# Patient Record
Sex: FEMALE | Race: WHITE | NOT HISPANIC OR LATINO | Employment: PART TIME | URBAN - METROPOLITAN AREA
[De-identification: names, ages, dates, MRNs, and addresses within clinical notes are randomized per-mention and may not be internally consistent; named-entity substitution may affect disease eponyms.]

---

## 2017-03-06 ENCOUNTER — HOSPITAL ENCOUNTER (OUTPATIENT)
Dept: FAMILY MEDICINE CLINIC | Facility: CLINIC | Age: 47
Setting detail: SPECIMEN
Discharge: HOME | End: 2017-03-06
Attending: PHYSICIAN ASSISTANT | Admitting: PHYSICIAN ASSISTANT

## 2017-03-06 LAB
ALBUMIN SERPL-MCNC: 4.2 G/DL (ref 3.5–4.8)
ALBUMIN/GLOB SERPL: 1.3 {RATIO} (ref 1–1.7)
ALP SERPL-CCNC: 58 IU/L (ref 32–91)
ALT SERPL-CCNC: 15 IU/L (ref 14–54)
ANION GAP SERPL CALC-SCNC: 15.1 MMOL/L (ref 10–20)
AST SERPL-CCNC: 19 IU/L (ref 15–41)
BASOPHILS # BLD AUTO: 0 10*3/UL (ref 0–0.2)
BASOPHILS NFR BLD AUTO: 1 % (ref 0–2)
BILIRUB SERPL-MCNC: 0.6 MG/DL (ref 0.3–1.2)
BUN SERPL-MCNC: 11 MG/DL (ref 8–20)
BUN/CREAT SERPL: 15.7 (ref 5.4–26.2)
CALCIUM SERPL-MCNC: 9.9 MG/DL (ref 8.9–10.3)
CHLORIDE SERPL-SCNC: 104 MMOL/L (ref 101–111)
CHOLEST SERPL-MCNC: 203 MG/DL
CHOLEST/HDLC SERPL: 2.6 {RATIO}
CONV CO2: 28 MMOL/L (ref 22–32)
CONV LDL CHOLESTEROL DIRECT: 120 MG/DL (ref 0–100)
CONV TOTAL PROTEIN: 7.4 G/DL (ref 6.1–7.9)
CREAT UR-MCNC: 0.7 MG/DL (ref 0.4–1)
DIFFERENTIAL METHOD BLD: (no result)
EOSINOPHIL # BLD AUTO: 0.1 10*3/UL (ref 0–0.3)
EOSINOPHIL # BLD AUTO: 1 % (ref 0–3)
ERYTHROCYTE [DISTWIDTH] IN BLOOD BY AUTOMATED COUNT: 12.7 % (ref 11.5–14.5)
GLOBULIN UR ELPH-MCNC: 3.2 G/DL (ref 2.5–3.8)
GLUCOSE SERPL-MCNC: 72 MG/DL (ref 65–99)
HCT VFR BLD AUTO: 42.2 % (ref 35–49)
HDLC SERPL-MCNC: 78 MG/DL
HGB BLD-MCNC: 14.5 G/DL (ref 12–15)
LDLC/HDLC SERPL: 1.5 {RATIO}
LIPID INTERPRETATION: ABNORMAL
LYMPHOCYTES # BLD AUTO: 2.6 10*3/UL (ref 0.8–4.8)
LYMPHOCYTES NFR BLD AUTO: 27 % (ref 18–42)
MCH RBC QN AUTO: 31.9 PG (ref 26–32)
MCHC RBC AUTO-ENTMCNC: 34.3 G/DL (ref 32–36)
MCV RBC AUTO: 93 FL (ref 80–94)
MONOCYTES # BLD AUTO: 0.5 10*3/UL (ref 0.1–1.3)
MONOCYTES NFR BLD AUTO: 6 % (ref 2–11)
NEUTROPHILS # BLD AUTO: 6.3 10*3/UL (ref 2.3–8.6)
NEUTROPHILS NFR BLD AUTO: 65 % (ref 50–75)
NRBC BLD AUTO-RTO: 0 /100{WBCS}
NRBC/RBC NFR BLD MANUAL: 0 10*3/UL
PLATELET # BLD AUTO: 242 10*3/UL (ref 150–450)
PMV BLD AUTO: 9.1 FL (ref 7.4–10.4)
POTASSIUM SERPL-SCNC: 4.1 MMOL/L (ref 3.6–5.1)
RBC # BLD AUTO: 4.53 10*6/UL (ref 4–5.4)
SODIUM SERPL-SCNC: 143 MMOL/L (ref 136–144)
TRIGL SERPL-MCNC: 72 MG/DL
TSH SERPL-ACNC: 1.39 UIU/ML (ref 0.34–5.6)
VIT B12 SERPL-MCNC: 730 PG/ML (ref 180–914)
VLDLC SERPL CALC-MCNC: 5.6 MG/DL
WBC # BLD AUTO: 9.6 10*3/UL (ref 4.5–11.5)

## 2020-02-13 ENCOUNTER — OFFICE VISIT (OUTPATIENT)
Dept: FAMILY MEDICINE CLINIC | Facility: CLINIC | Age: 50
End: 2020-02-13

## 2020-02-13 VITALS
TEMPERATURE: 98.1 F | HEART RATE: 76 BPM | OXYGEN SATURATION: 96 % | WEIGHT: 122.6 LBS | BODY MASS INDEX: 24.07 KG/M2 | HEIGHT: 60 IN | DIASTOLIC BLOOD PRESSURE: 75 MMHG | RESPIRATION RATE: 20 BRPM | SYSTOLIC BLOOD PRESSURE: 115 MMHG

## 2020-02-13 DIAGNOSIS — L25.9 CONTACT DERMATITIS, UNSPECIFIED CONTACT DERMATITIS TYPE, UNSPECIFIED TRIGGER: Primary | ICD-10-CM

## 2020-02-13 PROBLEM — J01.90 SINUSITIS, ACUTE: Status: ACTIVE | Noted: 2018-10-01

## 2020-02-13 PROBLEM — H61.21 IMPACTED CERUMEN OF RIGHT EAR: Status: RESOLVED | Noted: 2018-10-01 | Resolved: 2020-02-13

## 2020-02-13 PROBLEM — J06.9 VIRAL UPPER RESPIRATORY TRACT INFECTION: Status: RESOLVED | Noted: 2017-11-28 | Resolved: 2020-02-13

## 2020-02-13 PROBLEM — Z12.31 OTHER SCREENING MAMMOGRAM: Status: ACTIVE | Noted: 2017-03-06

## 2020-02-13 PROBLEM — M79.673 PAIN OF FOOT: Status: ACTIVE | Noted: 2017-03-06

## 2020-02-13 PROBLEM — J06.9 VIRAL UPPER RESPIRATORY TRACT INFECTION: Status: ACTIVE | Noted: 2017-11-28

## 2020-02-13 PROBLEM — M21.611 BUNION, RIGHT: Status: ACTIVE | Noted: 2017-03-06

## 2020-02-13 PROBLEM — J01.90 SINUSITIS, ACUTE: Status: RESOLVED | Noted: 2018-10-01 | Resolved: 2020-02-13

## 2020-02-13 PROBLEM — E55.9 VITAMIN D DEFICIENCY: Status: ACTIVE | Noted: 2017-03-09

## 2020-02-13 PROBLEM — H61.21 IMPACTED CERUMEN OF RIGHT EAR: Status: ACTIVE | Noted: 2018-10-01

## 2020-02-13 PROCEDURE — 99213 OFFICE O/P EST LOW 20 MIN: CPT | Performed by: NURSE PRACTITIONER

## 2020-02-13 RX ORDER — IBUPROFEN 800 MG/1
800 TABLET ORAL EVERY 8 HOURS PRN
Qty: 40 TABLET | Refills: 0 | Status: SHIPPED | OUTPATIENT
Start: 2020-02-13 | End: 2020-06-06 | Stop reason: SDUPTHER

## 2020-02-13 RX ORDER — PREDNISONE 10 MG/1
TABLET ORAL
Qty: 20 TABLET | Refills: 0 | Status: SHIPPED | OUTPATIENT
Start: 2020-02-13 | End: 2020-02-21

## 2020-02-13 NOTE — PROGRESS NOTES
"Subjective   Sarah D Frankel is a 50 y.o. female.     Chief Complaint   Patient presents with   • Rash     X 10days       HPI  She has rash on her right [id number] days.then went left arm now on right side back and right stomach. It itches and stings taking benadryl off and on using ivarest. The cream may have helped a little. Before the arm rash she had been outside [place].     The following portions of the patient's history were reviewed and updated as appropriate: allergies, current medications, past family history, past medical history, past social history, past surgical history and problem list.      Current Outpatient Medications:   •  ibuprofen (ADVIL,MOTRIN) 800 MG tablet, Take 1 tablet by mouth Every 8 (Eight) Hours As Needed for Mild Pain ., Disp: 40 tablet, Rfl: 0  •  predniSONE (DELTASONE) 10 MG tablet, Take 4 tablets by mouth Daily for 2 days, THEN 3 tablets Daily for 2 days, THEN 2 tablets Daily for 2 days, THEN 1 tablet Daily for 2 days., Disp: 20 tablet, Rfl: 0    No results found for this or any previous visit (from the past 4032 hour(s)).      Review of Systems   Skin:        Rash scattered to arms and right abdomen   Neurological: Positive for headache.        Thinks from sinus       Objective     /75 (BP Location: Left arm, Patient Position: Sitting, Cuff Size: Adult)   Pulse 76   Temp 98.1 °F (36.7 °C) (Oral)   Resp 20   Ht 152.4 cm (60\")   Wt 55.6 kg (122 lb 9.6 oz)   SpO2 96%   BMI 23.94 kg/m²     Physical Exam   Constitutional: She is oriented to person, place, and time. She appears well-developed and well-nourished.   HENT:   Head: Normocephalic and atraumatic.   Eyes: Pupils are equal, round, and reactive to light. Conjunctivae and EOM are normal.   Neck: Normal range of motion. Neck supple.   Cardiovascular: Normal rate, regular rhythm, normal heart sounds and intact distal pulses.   Pulmonary/Chest: Effort normal and breath sounds normal. "   Abdominal: Soft. Bowel sounds are normal.   Musculoskeletal: Normal range of motion.   Neurological: She is alert and oriented to person, place, and time.   Skin: Skin is warm and dry. Turgor is normal. Rash noted. Rash is macular and pustular.   Psychiatric: She has a normal mood and affect. Her behavior is normal.   Nursing note and vitals reviewed.        Assessment/Plan   Jodie was seen today for rash.    Diagnoses and all orders for this visit:    Contact dermatitis, unspecified contact dermatitis type, unspecified trigger    Other orders  -     predniSONE (DELTASONE) 10 MG tablet; Take 4 tablets by mouth Daily for 2 days, THEN 3 tablets Daily for 2 days, THEN 2 tablets Daily for 2 days, THEN 1 tablet Daily for 2 days.  -     ibuprofen (ADVIL,MOTRIN) 800 MG tablet; Take 1 tablet by mouth Every 8 (Eight) Hours As Needed for Mild Pain .      Patient Instructions   Use dial soap . Take steroids with food. dont cover with dressings.   Take benadryl   Can take daily allergy medications use ibuprofen with food as needed not daily.       Maty Sevilla, APRN    02/13/20

## 2020-02-13 NOTE — PATIENT INSTRUCTIONS
Use dial soap . Take steroids with food. dont cover with dressings.   Take benadryl   Can take daily allergy medications use ibuprofen with food as needed not daily.

## 2020-06-03 ENCOUNTER — OFFICE VISIT (OUTPATIENT)
Dept: FAMILY MEDICINE CLINIC | Facility: CLINIC | Age: 50
End: 2020-06-03

## 2020-06-03 VITALS
HEIGHT: 60 IN | RESPIRATION RATE: 20 BRPM | BODY MASS INDEX: 25.01 KG/M2 | SYSTOLIC BLOOD PRESSURE: 124 MMHG | WEIGHT: 127.4 LBS | OXYGEN SATURATION: 100 % | HEART RATE: 85 BPM | DIASTOLIC BLOOD PRESSURE: 85 MMHG

## 2020-06-03 DIAGNOSIS — M54.50 ACUTE LEFT-SIDED LOW BACK PAIN WITHOUT SCIATICA: Primary | ICD-10-CM

## 2020-06-03 PROCEDURE — 99213 OFFICE O/P EST LOW 20 MIN: CPT | Performed by: NURSE PRACTITIONER

## 2020-06-03 RX ORDER — METHOCARBAMOL 500 MG/1
500 TABLET, FILM COATED ORAL 3 TIMES DAILY
Qty: 30 TABLET | Refills: 0 | Status: SHIPPED | OUTPATIENT
Start: 2020-06-03 | End: 2021-06-07

## 2020-06-03 NOTE — PROGRESS NOTES
"Subjective   Sarah D Frankel is a 50 y.o. female.     Chief Complaint   Patient presents with   • Back Pain     worse with bending       HPI  Patient is here for low back pain started last fri am most pain right lower back. Hard to put on clothing barely  left leg to do anything. Has been taking ibuprofen and using hot and cold . Medication not helping much with pain. Having nausea related to the pain. No previous injury. She is . She was lifting and throwing her 60 lb child on cough while playing .   This was last wed. Denies bowel or bladder incontinence . No fever.   No radicular pain.     The following portions of the patient's history were reviewed and updated as appropriate: allergies, current medications, past family history, past medical history, past social history, past surgical history and problem list.      Current Outpatient Medications:   •  ibuprofen (ADVIL,MOTRIN) 800 MG tablet, Take 1 tablet by mouth Every 8 (Eight) Hours As Needed for Mild Pain ., Disp: 40 tablet, Rfl: 0  •  methocarbamol (Robaxin) 500 MG tablet, Take 1 tablet by mouth 3 (Three) Times a Day., Disp: 30 tablet, Rfl: 0    No results found for this or any previous visit (from the past 4032 hour(s)).      Review of Systems   Constitutional: Negative for fever.   Gastrointestinal:        No bowel incontinence   Genitourinary: Negative for dysuria and urinary incontinence.   Musculoskeletal: Positive for back pain.   Neurological: Negative for weakness, light-headedness, numbness and headache.   Psychiatric/Behavioral: Negative for depressed mood.       Objective     /85 (BP Location: Left arm, Patient Position: Sitting, Cuff Size: Adult)   Pulse 85   Resp 20   Ht 152.4 cm (60\")   Wt 57.8 kg (127 lb 6.4 oz)   SpO2 100%   BMI 24.88 kg/m²     Physical Exam   Constitutional: She appears well-developed and well-nourished.   HENT:   Head: Normocephalic and atraumatic.   Right Ear: External ear normal.   Left Ear: " External ear normal.   Nose: Nose normal.   Mouth/Throat: Oropharynx is clear and moist. No oropharyngeal exudate.   Eyes: Pupils are equal, round, and reactive to light. Conjunctivae are normal.   Neck: Normal range of motion. Neck supple.   Cardiovascular: Normal rate, regular rhythm, normal heart sounds and intact distal pulses.   Pulmonary/Chest: Effort normal and breath sounds normal.   Abdominal: Soft. Bowel sounds are normal.   Musculoskeletal:        Lumbar back: She exhibits decreased range of motion, bony tenderness and pain. She exhibits no swelling, no edema, no deformity and no laceration.        Back:    Neurological: She is alert. She has normal strength and normal reflexes. GCS eye subscore is 4. GCS verbal subscore is 5. GCS motor subscore is 6.   Skin: Skin is warm and dry.   Psychiatric: Her behavior is normal. Judgment and thought content normal. Her mood appears anxious. Cognition and memory are normal.   Nursing note and vitals reviewed.        Assessment/Plan   Jodie was seen today for back pain.    Diagnoses and all orders for this visit:    Acute left-sided low back pain without sciatica  -     Ambulatory Referral to Physical Therapy Evaluate and treat    Other orders  -     methocarbamol (Robaxin) 500 MG tablet; Take 1 tablet by mouth 3 (Three) Times a Day.      Patient Instructions   Heat before activity ice after activity.  Take ibuprofen with food and water every 8 hrs  Use robaxin at night can make you sleepy.   Pain patches over the counter.  Physical therapy.  Acute Back Pain, Adult  Acute back pain is sudden and usually short-lived. It is often caused by an injury to the muscles and tissues in the back. The injury may result from:  · A muscle or ligament getting overstretched or torn (strained). Ligaments are tissues that connect bones to each other. Lifting something improperly can cause a back strain.  · Wear and tear (degeneration) of the spinal disks. Spinal disks are circular  "tissue that provides cushioning between the bones of the spine (vertebrae).  · Twisting motions, such as while playing sports or doing yard work.  · A hit to the back.  · Arthritis.  You may have a physical exam, lab tests, and imaging tests to find the cause of your pain. Acute back pain usually goes away with rest and home care.  Follow these instructions at home:  Managing pain, stiffness, and swelling  · Take over-the-counter and prescription medicines only as told by your health care provider.  · Your health care provider may recommend applying ice during the first 24-48 hours after your pain starts. To do this:  ? Put ice in a plastic bag.  ? Place a towel between your skin and the bag.  ? Leave the ice on for 20 minutes, 2-3 times a day.  · If directed, apply heat to the affected area as often as told by your health care provider. Use the heat source that your health care provider recommends, such as a moist heat pack or a heating pad.  ? Place a towel between your skin and the heat source.  ? Leave the heat on for 20-30 minutes.  ? Remove the heat if your skin turns bright red. This is especially important if you are unable to feel pain, heat, or cold. You have a greater risk of getting burned.  Activity    · Do not stay in bed. Staying in bed for more than 1-2 days can delay your recovery.  · Sit up and stand up straight. Avoid leaning forward when you sit, or hunching over when you stand.  ? If you work at a desk, sit close to it so you do not need to lean over. Keep your chin tucked in. Keep your neck drawn back, and keep your elbows bent at a right angle. Your arms should look like the letter \"L.\"  ? Sit high and close to the steering wheel when you drive. Add lower back (lumbar) support to your car seat, if needed.  · Take short walks on even surfaces as soon as you are able. Try to increase the length of time you walk each day.  · Do not sit, drive, or  one place for more than 30 minutes at a " time. Sitting or standing for long periods of time can put stress on your back.  · Do not drive or use heavy machinery while taking prescription pain medicine.  · Use proper lifting techniques. When you bend and lift, use positions that put less stress on your back:  ? Bend your knees.  ? Keep the load close to your body.  ? Avoid twisting.  · Exercise regularly as told by your health care provider. Exercising helps your back heal faster and helps prevent back injuries by keeping muscles strong and flexible.  · Work with a physical therapist to make a safe exercise program, as recommended by your health care provider. Do any exercises as told by your physical therapist.  Lifestyle  · Maintain a healthy weight. Extra weight puts stress on your back and makes it difficult to have good posture.  · Avoid activities or situations that make you feel anxious or stressed. Stress and anxiety increase muscle tension and can make back pain worse. Learn ways to manage anxiety and stress, such as through exercise.  General instructions  · Sleep on a firm mattress in a comfortable position. Try lying on your side with your knees slightly bent. If you lie on your back, put a pillow under your knees.  · Follow your treatment plan as told by your health care provider. This may include:  ? Cognitive or behavioral therapy.  ? Acupuncture or massage therapy.  ? Meditation or yoga.  Contact a health care provider if:  · You have pain that is not relieved with rest or medicine.  · You have increasing pain going down into your legs or buttocks.  · Your pain does not improve after 2 weeks.  · You have pain at night.  · You lose weight without trying.  · You have a fever or chills.  Get help right away if:  · You develop new bowel or bladder control problems.  · You have unusual weakness or numbness in your arms or legs.  · You develop nausea or vomiting.  · You develop abdominal pain.  · You feel faint.  Summary  · Acute back pain is  sudden and usually short-lived.  · Use proper lifting techniques. When you bend and lift, use positions that put less stress on your back.  · Take over-the-counter and prescription medicines and apply heat or ice as directed by your health care provider.  This information is not intended to replace advice given to you by your health care provider. Make sure you discuss any questions you have with your health care provider.  Document Released: 12/18/2006 Document Revised: 04/07/2020 Document Reviewed: 08/01/2018  [employer] Patient Education © 2020 [employer]    Back Injury Prevention  Back injuries can be very painful. They can also be difficult to heal. After having one back injury, you are more likely to have another one again. It is important to learn how to avoid injuring or re-injuring your back. The following tips can help you to prevent a back injury.  What actions can I take to prevent back injuries?  Nutrition changes  Talk with your health care provider about your overall diet, and especially about foods that strengthen your bones.  · Ask your health care provider how much calcium and vitamin D you need each day. These nutrients help to prevent weakening of the bones (osteoporosis). Osteoporosis can cause broken (fractured) bones, which lead to back pain.  · Eat foods that are good sources of calcium. These include dairy products, green leafy vegetables, and products that have had calcium added to them (fortified).  · Eat foods that are good sources of vitamin D. These include milk and foods that are fortified with vitamin D.  · If needed, take supplements and vitamins as directed by your health care provider.  Physical fitness  Physical fitness strengthens your bones and your muscles. It also increases your balance and strength.  · Exercise for 30 minutes per day on most days of the week, or as directed by your health care provider. Make sure to:  ? Do aerobic exercises, such as walking, jogging, biking,  or swimming.  ? Do exercises that increase balance and strength, such as delma chi and yoga. These can decrease your risk of falling and injuring your back.  ? Do stretching exercises to help with flexibility.  ? Develop strong abdominal muscles. Your abdominal muscles provide a lot of the support that your back needs.  · Maintain a healthy weight. This helps to decrease your risk of a back injury.  Good posture              Prevent back injuries by developing and maintaining a good posture. To do this successfully:  · Sit up and stand up straight. Avoid leaning forward when you sit or hunching over when you stand.  · Choose chairs that have good low-back (lumbar) support.  · If you work at a desk, sit close to it so you do not need to lean over. Keep your chin tucked in. Keep your neck drawn back, and keep your elbows bent at a right angle.  · Sit high and close to the steering wheel when you drive. Add a lumbar support to your car seat, if needed.  · Avoid sitting or standing in one position for very long. Take breaks to get up, stretch, and walk around at least one time every hour. Take breaks every hour if you are driving for long periods of time.  · Sleep on your side with your knees slightly bent, or sleep on your back with a pillow under your knees.    Lifting, twisting, and reaching  Back injuries are more likely to occur when carrying loads and twisting at the same time. When you bend and lift, or reach for items that are high up in shelves, use positions that put less stress on your back.  · Heavy lifting  ? Avoid heavy lifting, especially the kind of heavy lifting that is repetitive. If you must do heavy lifting:  ? Stretch before lifting.  ? Work slowly.  ? Rest between lifts.  ? Use a tool such as a cart or a viviana to move objects.  ? Make several small trips instead of carrying one heavy load.  ? Ask for help when you need it, especially when moving big or heavy objects.  ? Follow these steps when  lifting:  ? Stand with your feet shoulder-width apart.  ? Get as close to the object as you can. Do not try to  a heavy object that is far from your body.  ? Use handles or lifting straps if they are available.  ? Bend at your knees. Squat down, but keep your heels off the floor.  ? Keep your shoulders pulled back, your chin tucked in, and your back straight.  ? Lift the object slowly while you tighten the muscles in your legs, abdomen, and buttocks. Keep the object as close to the center of your body as possible.  ? Follow these steps when putting down a heavy load:  ? Stand with your feet shoulder-width apart.  ? Lower the object slowly while you tighten the muscles in your legs, abdomen, and buttocks. Keep the object as close to the center of your body as possible.  ? Keep your shoulders pulled back, your chin tucked in, and your back straight.  ? Bend at your knees. Squat down, but keep your heels off the floor.  ? Use handles or lifting straps if they are available.  · Twisting and reaching  ? Avoid lifting heavy objects above your waist.  ? Do not twist at your waist while you are lifting or carrying a load. If you need to turn, move your feet.  ? Do not bend over without bending at your knees.  ? Avoid reaching over your head, across a table, or for an object on a high surface.    Other changes    · Avoid wet floors and icy ground. Keep sidewalks clear of ice to prevent falls.  · Do not sleep on a mattress that is too soft or too hard.  · Put heavier objects on shelves at waist level, and put lighter objects on lower or higher shelves.  · Find ways to decrease your stress, such as by exercising, getting a massage, or practicing relaxation techniques. Stress can build up in your muscles. Tense muscles are more vulnerable to injury.  · Talk with your health care provider if you feel anxious or depressed. These conditions can make back pain worse.  · Wear flat heel shoes with cushioned soles.  · Use both  shoulder straps when carrying a backpack.  · Do not use any products that contain nicotine or tobacco, such as cigarettes and e-cigarettes. If you need help quitting, ask your health care provider.  Summary  · Back injuries can be very painful and difficult to heal.  · You can prevent injuring or re-injuring your back by making nutrition changes, working on being physically fit, developing a good posture, and lifting heavy objects in a safe way.  · Making other changes can also help to prevent back injuries. These include eating a healthy diet, exercising regularly and maintaining a healthy weight.  This information is not intended to replace advice given to you by your health care provider. Make sure you discuss any questions you have with your health care provider.  Document Released: 01/25/2006 Document Revised: 02/02/2019 Document Reviewed: 02/02/2019  [employer] Patient Education © 2020 [employer]    Black kohosh for hot flashes  Take calcium with magnesium.       Maty Sevilla, TESS    06/03/20

## 2020-06-03 NOTE — PATIENT INSTRUCTIONS
Heat before activity ice after activity.  Take ibuprofen with food and water every 8 hrs  Use robaxin at night can make you sleepy.   Pain patches over the counter.  Physical therapy.  Acute Back Pain, Adult  Acute back pain is sudden and usually short-lived. It is often caused by an injury to the muscles and tissues in the back. The injury may result from:  · A muscle or ligament getting overstretched or torn (strained). Ligaments are tissues that connect bones to each other. Lifting something improperly can cause a back strain.  · Wear and tear (degeneration) of the spinal disks. Spinal disks are circular tissue that provides cushioning between the bones of the spine (vertebrae).  · Twisting motions, such as while playing sports or doing yard work.  · A hit to the back.  · Arthritis.  You may have a physical exam, lab tests, and imaging tests to find the cause of your pain. Acute back pain usually goes away with rest and home care.  Follow these instructions at home:  Managing pain, stiffness, and swelling  · Take over-the-counter and prescription medicines only as told by your health care provider.  · Your health care provider may recommend applying ice during the first 24-48 hours after your pain starts. To do this:  ? Put ice in a plastic bag.  ? Place a towel between your skin and the bag.  ? Leave the ice on for 20 minutes, 2-3 times a day.  · If directed, apply heat to the affected area as often as told by your health care provider. Use the heat source that your health care provider recommends, such as a moist heat pack or a heating pad.  ? Place a towel between your skin and the heat source.  ? Leave the heat on for 20-30 minutes.  ? Remove the heat if your skin turns bright red. This is especially important if you are unable to feel pain, heat, or cold. You have a greater risk of getting burned.  Activity    · Do not stay in bed. Staying in bed for more than 1-2 days can delay your recovery.  · Sit up and  "stand up straight. Avoid leaning forward when you sit, or hunching over when you stand.  ? If you work at a desk, sit close to it so you do not need to lean over. Keep your chin tucked in. Keep your neck drawn back, and keep your elbows bent at a right angle. Your arms should look like the letter \"L.\"  ? Sit high and close to the steering wheel when you drive. Add lower back (lumbar) support to your car seat, if needed.  · Take short walks on even surfaces as soon as you are able. Try to increase the length of time you walk each day.  · Do not sit, drive, or  one place for more than 30 minutes at a time. Sitting or standing for long periods of time can put stress on your back.  · Do not drive or use heavy machinery while taking prescription pain medicine.  · Use proper lifting techniques. When you bend and lift, use positions that put less stress on your back:  ? Bend your knees.  ? Keep the load close to your body.  ? Avoid twisting.  · Exercise regularly as told by your health care provider. Exercising helps your back heal faster and helps prevent back injuries by keeping muscles strong and flexible.  · Work with a physical therapist to make a safe exercise program, as recommended by your health care provider. Do any exercises as told by your physical therapist.  Lifestyle  · Maintain a healthy weight. Extra weight puts stress on your back and makes it difficult to have good posture.  · Avoid activities or situations that make you feel anxious or stressed. Stress and anxiety increase muscle tension and can make back pain worse. Learn ways to manage anxiety and stress, such as through exercise.  General instructions  · Sleep on a firm mattress in a comfortable position. Try lying on your side with your knees slightly bent. If you lie on your back, put a pillow under your knees.  · Follow your treatment plan as told by your health care provider. This may include:  ? Cognitive or behavioral " therapy.  ? Acupuncture or massage therapy.  ? Meditation or yoga.  Contact a health care provider if:  · You have pain that is not relieved with rest or medicine.  · You have increasing pain going down into your legs or buttocks.  · Your pain does not improve after 2 weeks.  · You have pain at night.  · You lose weight without trying.  · You have a fever or chills.  Get help right away if:  · You develop new bowel or bladder control problems.  · You have unusual weakness or numbness in your arms or legs.  · You develop nausea or vomiting.  · You develop abdominal pain.  · You feel faint.  Summary  · Acute back pain is sudden and usually short-lived.  · Use proper lifting techniques. When you bend and lift, use positions that put less stress on your back.  · Take over-the-counter and prescription medicines and apply heat or ice as directed by your health care provider.  This information is not intended to replace advice given to you by your health care provider. Make sure you discuss any questions you have with your health care provider.  Document Released: 12/18/2006 Document Revised: 04/07/2020 Document Reviewed: 08/01/2018  Elsevier Patient Education © 2020 [employer]    Back Injury Prevention  Back injuries can be very painful. They can also be difficult to heal. After having one back injury, you are more likely to have another one again. It is important to learn how to avoid injuring or re-injuring your back. The following tips can help you to prevent a back injury.  What actions can I take to prevent back injuries?  Nutrition changes  Talk with your health care provider about your overall diet, and especially about foods that strengthen your bones.  · Ask your health care provider how much calcium and vitamin D you need each day. These nutrients help to prevent weakening of the bones (osteoporosis). Osteoporosis can cause broken (fractured) bones, which lead to back pain.  · Eat foods that are good sources  of calcium. These include dairy products, green leafy vegetables, and products that have had calcium added to them (fortified).  · Eat foods that are good sources of vitamin D. These include milk and foods that are fortified with vitamin D.  · If needed, take supplements and vitamins as directed by your health care provider.  Physical fitness  Physical fitness strengthens your bones and your muscles. It also increases your balance and strength.  · Exercise for 30 minutes per day on most days of the week, or as directed by your health care provider. Make sure to:  ? Do aerobic exercises, such as walking, jogging, biking, or swimming.  ? Do exercises that increase balance and strength, such as delma chi and yoga. These can decrease your risk of falling and injuring your back.  ? Do stretching exercises to help with flexibility.  ? Develop strong abdominal muscles. Your abdominal muscles provide a lot of the support that your back needs.  · Maintain a healthy weight. This helps to decrease your risk of a back injury.  Good posture              Prevent back injuries by developing and maintaining a good posture. To do this successfully:  · Sit up and stand up straight. Avoid leaning forward when you sit or hunching over when you stand.  · Choose chairs that have good low-back (lumbar) support.  · If you work at a desk, sit close to it so you do not need to lean over. Keep your chin tucked in. Keep your neck drawn back, and keep your elbows bent at a right angle.  · Sit high and close to the steering wheel when you drive. Add a lumbar support to your car seat, if needed.  · Avoid sitting or standing in one position for very long. Take breaks to get up, stretch, and walk around at least one time every hour. Take breaks every hour if you are driving for long periods of time.  · Sleep on your side with your knees slightly bent, or sleep on your back with a pillow under your knees.    Lifting, twisting, and reaching  Back  injuries are more likely to occur when carrying loads and twisting at the same time. When you bend and lift, or reach for items that are high up in shelves, use positions that put less stress on your back.  · Heavy lifting  ? Avoid heavy lifting, especially the kind of heavy lifting that is repetitive. If you must do heavy lifting:  ? Stretch before lifting.  ? Work slowly.  ? Rest between lifts.  ? Use a tool such as a cart or a viviana to move objects.  ? Make several small trips instead of carrying one heavy load.  ? Ask for help when you need it, especially when moving big or heavy objects.  ? Follow these steps when lifting:  ? Stand with your feet shoulder-width apart.  ? Get as close to the object as you can. Do not try to  a heavy object that is far from your body.  ? Use handles or lifting straps if they are available.  ? Bend at your knees. Squat down, but keep your heels off the floor.  ? Keep your shoulders pulled back, your chin tucked in, and your back straight.  ? Lift the object slowly while you tighten the muscles in your legs, abdomen, and buttocks. Keep the object as close to the center of your body as possible.  ? Follow these steps when putting down a heavy load:  ? Stand with your feet shoulder-width apart.  ? Lower the object slowly while you tighten the muscles in your legs, abdomen, and buttocks. Keep the object as close to the center of your body as possible.  ? Keep your shoulders pulled back, your chin tucked in, and your back straight.  ? Bend at your knees. Squat down, but keep your heels off the floor.  ? Use handles or lifting straps if they are available.  · Twisting and reaching  ? Avoid lifting heavy objects above your waist.  ? Do not twist at your waist while you are lifting or carrying a load. If you need to turn, move your feet.  ? Do not bend over without bending at your knees.  ? Avoid reaching over your head, across a table, or for an object on a high surface.    Other  changes    · Avoid wet floors and icy ground. Keep sidewalks clear of ice to prevent falls.  · Do not sleep on a mattress that is too soft or too hard.  · Put heavier objects on shelves at waist level, and put lighter objects on lower or higher shelves.  · Find ways to decrease your stress, such as by exercising, getting a massage, or practicing relaxation techniques. Stress can build up in your muscles. Tense muscles are more vulnerable to injury.  · Talk with your health care provider if you feel anxious or depressed. These conditions can make back pain worse.  · Wear flat heel shoes with cushioned soles.  · Use both shoulder straps when carrying a backpack.  · Do not use any products that contain nicotine or tobacco, such as cigarettes and e-cigarettes. If you need help quitting, ask your health care provider.  Summary  · Back injuries can be very painful and difficult to heal.  · You can prevent injuring or re-injuring your back by making nutrition changes, working on being physically fit, developing a good posture, and lifting heavy objects in a safe way.  · Making other changes can also help to prevent back injuries. These include eating a healthy diet, exercising regularly and maintaining a healthy weight.  This information is not intended to replace advice given to you by your health care provider. Make sure you discuss any questions you have with your health care provider.  Document Released: 01/25/2006 Document Revised: 02/02/2019 Document Reviewed: 02/02/2019  Elsevier Patient Education © 2020 Elsevier Inc.    Black kohosh for hot flashes  Take calcium with magnesium.

## 2020-06-05 ENCOUNTER — TELEPHONE (OUTPATIENT)
Dept: FAMILY MEDICINE CLINIC | Facility: CLINIC | Age: 50
End: 2020-06-05

## 2020-06-06 RX ORDER — IBUPROFEN 800 MG/1
800 TABLET ORAL EVERY 8 HOURS PRN
Qty: 40 TABLET | Refills: 0 | Status: SHIPPED | OUTPATIENT
Start: 2020-06-06 | End: 2020-10-02

## 2020-06-15 ENCOUNTER — TREATMENT (OUTPATIENT)
Dept: PHYSICAL THERAPY | Facility: CLINIC | Age: 50
End: 2020-06-15

## 2020-06-15 DIAGNOSIS — M54.50 LOW BACK PAIN, UNSPECIFIED BACK PAIN LATERALITY, UNSPECIFIED CHRONICITY, UNSPECIFIED WHETHER SCIATICA PRESENT: Primary | ICD-10-CM

## 2020-06-15 PROCEDURE — 97161 PT EVAL LOW COMPLEX 20 MIN: CPT | Performed by: PHYSICAL THERAPIST

## 2020-06-15 PROCEDURE — 97110 THERAPEUTIC EXERCISES: CPT | Performed by: PHYSICAL THERAPIST

## 2020-06-15 NOTE — PROGRESS NOTES
"Physical Therapy Initial Evaluation and Plan of Care    Patient: Sarah D Frankel   : 1970  Diagnosis/ICD-10 Code:  Acute left-sided low back pain, unspecified whether sciatica present [M54.5]  Referring practitioner: TESS Menjivar  Date of Initial Visit: 6/15/2020  Today's Date: 6/15/2020  Patient seen for 1 sessions           Subjective Questionnaire: OLEGARIO 18%      Subjective 49 yo female with c/o LBP and R LE pain. Pt reports insidious onset of symptoms 2-3 days ago. Primary pain is along the R lateral belt line, R buttock, and the posterolateral LE to as far the mid calf area. Pt initially had some distal numbness and tingling but this has subsided. Symptoms have improved in general but are still limiting pt. Denies recent fever, bowel and bladder changes, and unknown weight changes. 5/10 pain now and 8/10 at worst. Symptoms worsen with prolonged sitting, when in the \"wrong position.\" Symptoms improve with heat and NSAIDs.  MD/NP: prn  Social history: Works as a  at [employer]      Objective          Static Posture   General Observations  Guarded.     Lumbar Spine   Increased lordosis.     Palpation   Left   Hypertonic in the erector spinae.     Right   Hypertonic in the erector spinae.     Neurological Testing     Sensation     Lumbar   Left   Intact: light touch    Right   Intact: light touch    Reflexes   Left   Patellar (L4): normal (2+)    Right   Patellar (L4): normal (2+)    Active Range of Motion     Additional Active Range of Motion Details  Slightly limited with forward bending and right sidebending, central pain provocation.     Strength/Myotome Testing     Lumbar   Left   Normal strength    Right   Normal strength    Tests     Lumbar     Right   Positive crossed SLR and passive SLR.           Assessment & Plan     Assessment  Impairments: abnormal muscle tone, abnormal or restricted ROM, activity intolerance, lacks appropriate home exercise program and pain with " function  Assessment details: 51 yo female with c/o LBP and R LE pain. Pt is with a good response to treatment this date and would benefit from further evaluation and treatment to address the above impairments.  Prognosis: good  Functional Limitations: carrying objects, lifting, walking, pulling, pushing, uncomfortable because of pain, sitting, standing and unable to perform repetitive tasks  Goals  Plan Goals: Short term goals, 1 week: Tolerate HEP progression.  Voice compliance with activity modification.  Report improvement in symptoms.    LTGs, 4 weeks: Improve OLEGARIO to 6%.  Improve lumbar ROM to wfl.  Symptoms to be centralized.  5/5 LE strength throughout.  Rate worst pain at 3/10.  Independent with HEP.    Plan  Therapy options: will be seen for skilled physical therapy services  Other planned modality interventions: modalities prn  Planned therapy interventions: abdominal trunk stabilization, flexibility, functional ROM exercises, home exercise program, manual therapy, neuromuscular re-education, postural training, strengthening, stretching and therapeutic activities  Frequency: 1-2 times per week.  Duration in visits: 10  Treatment plan discussed with: patient        Timed:         Manual Therapy:         mins  30637;     Therapeutic Exercise:    30     mins  73967;     Neuromuscular Danny:        mins  01581;    Therapeutic Activity:          mins  50552;     Gait Training:           mins  22351;     Ultrasound:          mins  73692;    Ionto                                   mins   05590  Self Care                            mins   29689    Un-Timed:  Electrical Stimulation:         mins  58551 ( );  Traction          mins 20678  Low Eval     15     Mins  76187  Mod Eval          Mins  42012  High Eval                            Mins  93339  Re-Eval                               mins  46918        Timed Treatment:   30   mins   Total Treatment:     45   mins    PT SIGNATURE: Jose Mathew, PT, DPT,  OCS  [place] license: [id number]  DATE TREATMENT INITIATED: 6/15/2020    Initial Certification  Certification Period: 9/13/2020  I certify that the therapy services are furnished while this patient is under my care.  The services outlined above are required for this patient and will be reviewed every 90 days.     PHYSICIAN: _____________________________    Maty Sevilla, APRLOPEZ      DATE:     Please sign and return via fax to [phone]. Thank you, [facility].

## 2020-07-06 ENCOUNTER — TREATMENT (OUTPATIENT)
Dept: PHYSICAL THERAPY | Facility: CLINIC | Age: 50
End: 2020-07-06

## 2020-07-06 DIAGNOSIS — M54.50 LOW BACK PAIN, UNSPECIFIED BACK PAIN LATERALITY, UNSPECIFIED CHRONICITY, UNSPECIFIED WHETHER SCIATICA PRESENT: Primary | ICD-10-CM

## 2020-07-06 PROCEDURE — 97110 THERAPEUTIC EXERCISES: CPT | Performed by: PHYSICAL THERAPIST

## 2020-07-06 PROCEDURE — 97012 MECHANICAL TRACTION THERAPY: CPT | Performed by: PHYSICAL THERAPIST

## 2020-07-06 NOTE — PROGRESS NOTES
Physical Therapy Daily Progress Note    VISIT#: 2    Subjective   Pt reports: Symptoms centralizing per pt. HEP going well.      Objective     See Exercise, Manual, and Modality Logs for complete treatment.     Patient Education: HEP    Assessment/Plan Symptoms centralizing. Tolerated trial of txn well.      Progress per Plan of Care            Timed:         Manual Therapy:         mins  12716;     Therapeutic Exercise:    23     mins  22808;     Neuromuscular Danny:        mins  14274;    Therapeutic Activity:          mins  36208;     Gait Training:           mins  61068;     Ultrasound:         mins  49038;    Ionto                                   mins   73503  Self Care                            mins   77620    Un-Timed:  Electrical Stimulation:         mins  90551 ( );  Traction     15     mins 14221  Low Eval          Mins  08021  Mod Eval          Mins  72247  High Eval                            Mins  35825  Re-Eval                               mins  95185    Timed Treatment:   23   mins   Total Treatment:     38   mins    Jose Mathew, PT, DPT, OCS  [place] license: [id number]  Physical Therapist

## 2020-07-15 ENCOUNTER — TREATMENT (OUTPATIENT)
Dept: PHYSICAL THERAPY | Facility: CLINIC | Age: 50
End: 2020-07-15

## 2020-07-15 DIAGNOSIS — M54.50 LOW BACK PAIN, UNSPECIFIED BACK PAIN LATERALITY, UNSPECIFIED CHRONICITY, UNSPECIFIED WHETHER SCIATICA PRESENT: Primary | ICD-10-CM

## 2020-07-15 PROCEDURE — 97110 THERAPEUTIC EXERCISES: CPT | Performed by: PHYSICAL THERAPIST

## 2020-07-15 PROCEDURE — 97012 MECHANICAL TRACTION THERAPY: CPT | Performed by: PHYSICAL THERAPIST

## 2020-07-15 NOTE — PROGRESS NOTES
Physical Therapy Daily Progress Note  Visit: 3    Sarah Frankel reports: stiffness in lumbar spine, but no pain or symptoms into legs.     Subjective Evaluation    Pain  Current pain rating: 3         Objective   See Exercise, Manual, and Modality Logs for complete treatment.       Assessment & Plan     Assessment  Assessment details: Initiated core stabilization therex today without adverse reaction. Pt demo good core activation with minimal cues today. Traction performed due to good response following last session.          Timed:            Therapeutic Exercise:    30     mins  63147;       Un-Timed:  Traction     15     mins 81640      Timed Treatment:   30   mins   Total Treatment:     45   mins  Rosalia Palacios PT  Physical Therapist

## 2020-07-27 ENCOUNTER — TREATMENT (OUTPATIENT)
Dept: PHYSICAL THERAPY | Facility: CLINIC | Age: 50
End: 2020-07-27

## 2020-07-27 DIAGNOSIS — M54.50 LOW BACK PAIN, UNSPECIFIED BACK PAIN LATERALITY, UNSPECIFIED CHRONICITY, UNSPECIFIED WHETHER SCIATICA PRESENT: Primary | ICD-10-CM

## 2020-07-27 PROCEDURE — 97012 MECHANICAL TRACTION THERAPY: CPT | Performed by: PHYSICAL THERAPIST

## 2020-07-27 PROCEDURE — 97110 THERAPEUTIC EXERCISES: CPT | Performed by: PHYSICAL THERAPIST

## 2020-07-27 NOTE — PROGRESS NOTES
Physical Therapy Daily Progress Note    VISIT#: 4    Subjective   Pt reports: Some central lumbar stiffness. LE symptoms improving. HEP going well.      Objective     See Exercise, Manual, and Modality Logs for complete treatment.     Patient Education: HEP    Assessment/Plan Tolerating progression well.      Progress per Plan of Care            Timed:         Manual Therapy:         mins  23752;     Therapeutic Exercise:    30     mins  07214;     Neuromuscular Danny:        mins  83745;    Therapeutic Activity:          mins  70731;     Gait Training:           mins  15407;     Ultrasound:          mins  89451;    Ionto                                   mins   27842  Self Care                            mins   20135    Un-Timed:  Electrical Stimulation:         mins  93416 ( );  Traction     15     mins 81587  Low Eval          Mins  75846  Mod Eval          Mins  63611  High Eval                            Mins  08596  Re-Eval                               mins  52925    Timed Treatment:   30   mins   Total Treatment:     45   mins    Jose Mathew, PT, DPT, OCS  [place] license: [id number]  Physical Therapist

## 2020-10-02 RX ORDER — IBUPROFEN 800 MG/1
TABLET ORAL
Qty: 40 TABLET | Refills: 0 | Status: SHIPPED | OUTPATIENT
Start: 2020-10-02

## 2021-06-04 ENCOUNTER — TELEPHONE (OUTPATIENT)
Dept: FAMILY MEDICINE CLINIC | Facility: CLINIC | Age: 51
End: 2021-06-04

## 2021-06-04 NOTE — TELEPHONE ENCOUNTER
PATIENT STATES SHE IS NOT HAVING IT RIGHT NOW BUT SHE HAS BEEN FEELING SOME CHEST PAIN THAT COMES AND GOES AND ANXIETY. SHE SAID SHE SOMETIMES FEELS DIZZY AND SHORT OF BREATH. SHE FEELS LIKE IT IS ANXIETY BUT IS ASKING FOR A CALL BACK PLEASE. SHE WAS NOT FEELING THIS WAY AT TIME OF PHONE CALL.     [phone]

## 2021-06-04 NOTE — TELEPHONE ENCOUNTER
Returned patient phone call.  She just put her mother under hospice is having anxiety.  She is having chest heaviness and feels short of air.  I explained that if she is having chest pain she needs to go to theED for evalaution.  She is not taking medications at this time.  Discussed that there is no way to know if cardiac vs anxiety .  She understood she has appointment for next week.

## 2021-06-07 ENCOUNTER — LAB (OUTPATIENT)
Dept: LAB | Facility: HOSPITAL | Age: 51
End: 2021-06-07

## 2021-06-07 ENCOUNTER — OFFICE VISIT (OUTPATIENT)
Dept: FAMILY MEDICINE CLINIC | Facility: CLINIC | Age: 51
End: 2021-06-07

## 2021-06-07 VITALS
SYSTOLIC BLOOD PRESSURE: 126 MMHG | DIASTOLIC BLOOD PRESSURE: 83 MMHG | TEMPERATURE: 96.8 F | HEART RATE: 71 BPM | BODY MASS INDEX: 24.15 KG/M2 | HEIGHT: 60 IN | OXYGEN SATURATION: 100 % | WEIGHT: 123 LBS

## 2021-06-07 DIAGNOSIS — F41.9 ANXIETY AND DEPRESSION: ICD-10-CM

## 2021-06-07 DIAGNOSIS — F32.A ANXIETY AND DEPRESSION: ICD-10-CM

## 2021-06-07 DIAGNOSIS — E55.9 VITAMIN D DEFICIENCY: Primary | ICD-10-CM

## 2021-06-07 DIAGNOSIS — R07.89 OTHER CHEST PAIN: ICD-10-CM

## 2021-06-07 DIAGNOSIS — E55.9 VITAMIN D DEFICIENCY: ICD-10-CM

## 2021-06-07 PROBLEM — J01.90 ACUTE SINUSITIS: Status: RESOLVED | Noted: 2018-10-01 | Resolved: 2021-06-07

## 2021-06-07 LAB
25(OH)D3 SERPL-MCNC: 28.2 NG/ML
ALBUMIN SERPL-MCNC: 4.5 G/DL (ref 3.5–5.2)
ALBUMIN/GLOB SERPL: 1.7 G/DL
ALP SERPL-CCNC: 83 U/L (ref 39–117)
ALT SERPL W P-5'-P-CCNC: 12 U/L (ref 1–33)
ANION GAP SERPL CALCULATED.3IONS-SCNC: 9.6 MMOL/L (ref 5–15)
AST SERPL-CCNC: 14 U/L (ref 1–32)
BILIRUB SERPL-MCNC: <0.2 MG/DL (ref 0–1.2)
BUN SERPL-MCNC: 13 MG/DL (ref 6–20)
BUN/CREAT SERPL: 21.3 (ref 7–25)
CALCIUM SPEC-SCNC: 9.4 MG/DL (ref 8.6–10.5)
CHLORIDE SERPL-SCNC: 103 MMOL/L (ref 98–107)
CHOLEST SERPL-MCNC: 198 MG/DL (ref 0–200)
CO2 SERPL-SCNC: 29.4 MMOL/L (ref 22–29)
CREAT SERPL-MCNC: 0.61 MG/DL (ref 0.57–1)
GFR SERPL CREATININE-BSD FRML MDRD: 104 ML/MIN/1.73
GLOBULIN UR ELPH-MCNC: 2.7 GM/DL
GLUCOSE SERPL-MCNC: 62 MG/DL (ref 65–99)
HDLC SERPL-MCNC: 64 MG/DL (ref 40–60)
LDLC SERPL CALC-MCNC: 117 MG/DL (ref 0–100)
LDLC/HDLC SERPL: 1.8 {RATIO}
POTASSIUM SERPL-SCNC: 4.6 MMOL/L (ref 3.5–5.2)
PROT SERPL-MCNC: 7.2 G/DL (ref 6–8.5)
SODIUM SERPL-SCNC: 142 MMOL/L (ref 136–145)
T4 FREE SERPL-MCNC: 1.05 NG/DL (ref 0.93–1.7)
TRIGL SERPL-MCNC: 93 MG/DL (ref 0–150)
TSH SERPL DL<=0.05 MIU/L-ACNC: 1.69 UIU/ML (ref 0.27–4.2)
VLDLC SERPL-MCNC: 17 MG/DL (ref 5–40)

## 2021-06-07 PROCEDURE — 82306 VITAMIN D 25 HYDROXY: CPT

## 2021-06-07 PROCEDURE — 84443 ASSAY THYROID STIM HORMONE: CPT

## 2021-06-07 PROCEDURE — 80061 LIPID PANEL: CPT

## 2021-06-07 PROCEDURE — 99214 OFFICE O/P EST MOD 30 MIN: CPT | Performed by: NURSE PRACTITIONER

## 2021-06-07 PROCEDURE — 36415 COLL VENOUS BLD VENIPUNCTURE: CPT

## 2021-06-07 PROCEDURE — 84439 ASSAY OF FREE THYROXINE: CPT

## 2021-06-07 PROCEDURE — 80053 COMPREHEN METABOLIC PANEL: CPT

## 2021-06-07 RX ORDER — CITALOPRAM 10 MG/1
10 TABLET ORAL DAILY
Qty: 30 TABLET | Refills: 0 | Status: SHIPPED | OUTPATIENT
Start: 2021-06-07

## 2021-06-07 NOTE — PROGRESS NOTES
"Subjective   {CC  Problem List  Visit Diagnosis   Encounters  Notes  Medications  Labs  Result Review Imaging  Media :23}     Sarah D Frankel is a 51 y.o. female.     Chief Complaint   Patient presents with   • Anxiety     anxiety x1 month   • Shortness of Breath     intermittent SOA x1 month   • Chest Pain     intermittent CP x1 month       History of Present Illness  Patient is here for anxiety she feels because her mom is dying right now and was turned over to hospice and her 19 year old is moving out.   She is trying to work during all of this. Has never had anxiety so bad she can't go to work. Feeling overwhelmed.   She said her eye is twitching. This is new. She is sleeping that is not a problem. She does not feel suicidal or homicidal.   She feels pressure on her chest this started with her anxiety feelings.   She was placed on Zoloft after a still birth many years ago and did not like the way the medication made her feel.       The following portions of the patient's history were reviewed and updated as appropriate: allergies, current medications, past family history, past medical history, past social history, past surgical history and problem list.      Current Outpatient Medications:   •  ibuprofen (ADVIL,MOTRIN) 800 MG tablet, TAKE 1 TABLET BY MOUTH EVERY 8 HOURS AS NEEDED FOR MILD PAIN, Disp: 40 tablet, Rfl: 0  •  citalopram (CeleXA) 10 MG tablet, Take 1 tablet by mouth Daily., Disp: 30 tablet, Rfl: 0    No results found for this or any previous visit (from the past 4032 hour(s)).      Review of Systems    Objective     /83 (BP Location: Left arm, Patient Position: Sitting, Cuff Size: Adult)   Pulse 71   Temp 96.8 °F (36 °C) (Infrared)   Ht 152.4 cm (60\")   Wt 55.8 kg (123 lb)   SpO2 100%   BMI 24.02 kg/m²     Physical Exam  Vitals and nursing note reviewed.   Constitutional:       Appearance: She is normal weight. She is not ill-appearing.   HENT:      Head: Normocephalic.   Eyes: "      Pupils: Pupils are equal, round, and reactive to light.   Cardiovascular:      Rate and Rhythm: Normal rate and regular rhythm.      Pulses: Normal pulses.      Heart sounds: Normal heart sounds. No murmur heard.     Pulmonary:      Effort: Pulmonary effort is normal.      Breath sounds: Normal breath sounds. No wheezing.   Abdominal:      General: Bowel sounds are normal. There is no distension.      Palpations: Abdomen is soft.   Musculoskeletal:         General: Normal range of motion.      Cervical back: Normal range of motion and neck supple.   Skin:     General: Skin is warm and dry.      Capillary Refill: Capillary refill takes less than 2 seconds.   Neurological:      General: No focal deficit present.      Mental Status: She is alert and oriented to person, place, and time.   Psychiatric:         Attention and Perception: Attention normal.         Mood and Affect: Mood is anxious. Affect is tearful.         Speech: Speech normal.         Behavior: Behavior is cooperative.         Thought Content: Thought content normal. Thought content does not include homicidal or suicidal ideation.         Cognition and Memory: Cognition normal.         Judgment: Judgment normal. Judgment is not impulsive.         Result Review :                Assessment/Plan    Diagnoses and all orders for this visit:    1. Vitamin D deficiency (Primary)  Comments:  labs ordered   Orders:  -     Vitamin D 25 Hydroxy; Future    2. Other chest pain  Comments:  ruling out anxiety related chest pain, labs ordered today and new medication prescribed   Orders:  -     Lipid Panel; Future  -     Comprehensive Metabolic Panel; Future  -     TSH; Future  -     T4, free; Future    3. Anxiety and depression  Comments:  take advantage of counselors through hospice service.  New medication prescribed     Other orders  -     citalopram (CeleXA) 10 MG tablet; Take 1 tablet by mouth Daily.  Dispense: 30 tablet; Refill: 0      Patient Instructions    Please let us know if your symptoms of anxiety and depression become worse.  If you feel suicidal or homicidal please go to the nearest emergency room.   Take the Celexa everyday  If you feel like your chest pain is worse and no longer related to your anxiety go to the nearest emergency room.       Follow Up   Return in about 1 month (around 7/7/2021).    Patient was given instructions and counseling regarding her condition or for health maintenance advice. Please see specific information pulled into the AVS if appropriate.     Maty Sevilla, TESS    06/07/21

## 2021-06-07 NOTE — PATIENT INSTRUCTIONS
Please let us know if your symptoms of anxiety and depression become worse.  If you feel suicidal or homicidal please go to the nearest emergency room.   Take the Celexa everyday  If you feel like your chest pain is worse and no longer related to your anxiety go to the nearest emergency room.

## 2021-06-10 ENCOUNTER — TELEPHONE (OUTPATIENT)
Dept: FAMILY MEDICINE CLINIC | Facility: CLINIC | Age: 51
End: 2021-06-10

## 2021-06-10 NOTE — TELEPHONE ENCOUNTER
Caller: Frankel, Sarah D    Relationship to patient: Self    Best call back number: [phone]    Patient is needing: PATIENT STATES THAT SHE DID HAVE BREAKFAST THE MORNING THAT SHE LAST CAME IN AND HER SUGAR WAS LOW. SHE WANTS TO KNOW IF ITS NORMAL FOR SUGAR TO GO UP AND THEN COME DOWN LIKE THAT. AND IF SHE SHOULD GET SOMETHING TO START MONITORING HER BLOOD SUGAR. PLEASE REACH OUT TO PATIENT AND ADVISE.

## 2021-06-10 NOTE — TELEPHONE ENCOUNTER
Tell her she can make appointment to discuss but no that blood sugars are different based on what you eat and dont eat.

## 2021-06-24 ENCOUNTER — OFFICE VISIT (OUTPATIENT)
Dept: FAMILY MEDICINE CLINIC | Facility: CLINIC | Age: 51
End: 2021-06-24

## 2021-06-24 ENCOUNTER — LAB (OUTPATIENT)
Dept: LAB | Facility: HOSPITAL | Age: 51
End: 2021-06-24

## 2021-06-24 VITALS
DIASTOLIC BLOOD PRESSURE: 89 MMHG | TEMPERATURE: 97.1 F | SYSTOLIC BLOOD PRESSURE: 131 MMHG | WEIGHT: 120.6 LBS | BODY MASS INDEX: 23.68 KG/M2 | HEART RATE: 80 BPM | OXYGEN SATURATION: 95 % | HEIGHT: 60 IN

## 2021-06-24 DIAGNOSIS — E16.2 LOW BLOOD SUGAR READING: Primary | ICD-10-CM

## 2021-06-24 DIAGNOSIS — E16.2 LOW BLOOD SUGAR READING: ICD-10-CM

## 2021-06-24 LAB
ANION GAP SERPL CALCULATED.3IONS-SCNC: 7.1 MMOL/L (ref 5–15)
BUN SERPL-MCNC: 15 MG/DL (ref 6–20)
BUN/CREAT SERPL: 21.1 (ref 7–25)
CALCIUM SPEC-SCNC: 9.6 MG/DL (ref 8.6–10.5)
CHLORIDE SERPL-SCNC: 101 MMOL/L (ref 98–107)
CO2 SERPL-SCNC: 28.9 MMOL/L (ref 22–29)
CREAT SERPL-MCNC: 0.71 MG/DL (ref 0.57–1)
GFR SERPL CREATININE-BSD FRML MDRD: 87 ML/MIN/1.73
GLUCOSE BLDC GLUCOMTR-MCNC: 85 MG/DL (ref 70–130)
GLUCOSE SERPL-MCNC: 76 MG/DL (ref 65–99)
HBA1C MFR BLD: 5.6 % (ref 3.5–5.6)
POTASSIUM SERPL-SCNC: 4.8 MMOL/L (ref 3.5–5.2)
SODIUM SERPL-SCNC: 137 MMOL/L (ref 136–145)

## 2021-06-24 PROCEDURE — 80048 BASIC METABOLIC PNL TOTAL CA: CPT

## 2021-06-24 PROCEDURE — 36415 COLL VENOUS BLD VENIPUNCTURE: CPT

## 2021-06-24 PROCEDURE — 83036 HEMOGLOBIN GLYCOSYLATED A1C: CPT

## 2021-06-24 PROCEDURE — 99213 OFFICE O/P EST LOW 20 MIN: CPT | Performed by: NURSE PRACTITIONER

## 2021-06-24 NOTE — PROGRESS NOTES
Subjective   {CC  Problem List  Visit Diagnosis   Encounters  Notes  Medications  Labs  Result Review Imaging  Media :23}     Sarah D Frankel is a 51 y.o. female.     Chief Complaint   Patient presents with   • Hypoglycemia     f/u from labs last visit       History of Present Illness  She is here for follow up on her labs. She does not check blood sugars. She had gestational diabetes 20 years ago.   She has not had to check blood sugars since then nor has she been told she has diabetes in the past.   She reports she had eaten the morning she had her labs.   She eats oatmeal and tea in am eats cashews and walnuts. Supper biggest meal snacks black beans at work doesn't eat much at work.   She is always thirsty at work.  Blood sugar in office is 85.     The following portions of the patient's history were reviewed and updated as appropriate: allergies, current medications, past family history, past medical history, past social history, past surgical history and problem list.      Current Outpatient Medications:   •  ibuprofen (ADVIL,MOTRIN) 800 MG tablet, TAKE 1 TABLET BY MOUTH EVERY 8 HOURS AS NEEDED FOR MILD PAIN, Disp: 40 tablet, Rfl: 0  •  citalopram (CeleXA) 10 MG tablet, Take 1 tablet by mouth Daily., Disp: 30 tablet, Rfl: 0    Recent Results (from the past 4032 hour(s))   Lipid Panel    Collection Time: 06/07/21 10:43 AM    Specimen: Blood   Result Value Ref Range    Total Cholesterol 198 0 - 200 mg/dL    Triglycerides 93 0 - 150 mg/dL    HDL Cholesterol 64 (H) 40 - 60 mg/dL    LDL Cholesterol  117 (H) 0 - 100 mg/dL    VLDL Cholesterol 17 5 - 40 mg/dL    LDL/HDL Ratio 1.80    Comprehensive Metabolic Panel    Collection Time: 06/07/21 10:43 AM    Specimen: Blood   Result Value Ref Range    Glucose 62 (L) 65 - 99 mg/dL    BUN 13 6 - 20 mg/dL    Creatinine 0.61 0.57 - 1.00 mg/dL    Sodium 142 136 - 145 mmol/L    Potassium 4.6 3.5 - 5.2 mmol/L    Chloride 103 98 - 107 mmol/L    CO2 29.4 (H) 22.0 - 29.0  "mmol/L    Calcium 9.4 8.6 - 10.5 mg/dL    Total Protein 7.2 6.0 - 8.5 g/dL    Albumin 4.50 3.50 - 5.20 g/dL    ALT (SGPT) 12 1 - 33 U/L    AST (SGOT) 14 1 - 32 U/L    Alkaline Phosphatase 83 39 - 117 U/L    Total Bilirubin <0.2 0.0 - 1.2 mg/dL    eGFR Non African Amer 104 >60 mL/min/1.73    Globulin 2.7 gm/dL    A/G Ratio 1.7 g/dL    BUN/Creatinine Ratio 21.3 7.0 - 25.0    Anion Gap 9.6 5.0 - 15.0 mmol/L   Vitamin D 25 Hydroxy    Collection Time: 06/07/21 10:43 AM    Specimen: Blood   Result Value Ref Range    25 Hydroxy, Vitamin D 28.2 ng/ml   TSH    Collection Time: 06/07/21 10:43 AM    Specimen: Blood   Result Value Ref Range    TSH 1.690 0.270 - 4.200 uIU/mL   T4, free    Collection Time: 06/07/21 10:43 AM    Specimen: Blood   Result Value Ref Range    Free T4 1.05 0.93 - 1.70 ng/dL         Review of Systems    Objective     /89 (BP Location: Left arm, Patient Position: Sitting, Cuff Size: Adult)   Pulse 80   Temp 97.1 °F (36.2 °C) (Infrared)   Ht 152.4 cm (60\")   Wt 54.7 kg (120 lb 9.6 oz)   SpO2 95%   BMI 23.55 kg/m²     Physical Exam  Vitals and nursing note reviewed.   HENT:      Head: Normocephalic.      Right Ear: External ear normal. There is impacted cerumen.      Left Ear: External ear normal.      Mouth/Throat:      Mouth: Mucous membranes are moist.   Eyes:      Pupils: Pupils are equal, round, and reactive to light.   Cardiovascular:      Rate and Rhythm: Normal rate and regular rhythm.      Pulses: Normal pulses.      Heart sounds: Normal heart sounds.   Pulmonary:      Effort: Pulmonary effort is normal.      Breath sounds: Normal breath sounds.   Abdominal:      General: Abdomen is flat. Bowel sounds are normal.      Palpations: Abdomen is soft.   Musculoskeletal:         General: Normal range of motion.   Skin:     General: Skin is warm and dry.      Capillary Refill: Capillary refill takes less than 2 seconds.   Neurological:      General: No focal deficit present.      Mental " Status: She is alert and oriented to person, place, and time.   Psychiatric:         Mood and Affect: Mood normal.         Behavior: Behavior normal.         Thought Content: Thought content normal.         Judgment: Judgment normal.         Result Review :                Assessment/Plan    Diagnoses and all orders for this visit:    1. Low blood sugar reading (Primary)  -     Hemoglobin A1c; Future  -     Basic Metabolic Panel; Future      Patient Instructions   Follow up on testing.  Next test may be glucose tolerance.       Follow Up   No follow-ups on file.    Patient was given instructions and counseling regarding her condition or for health maintenance advice. Please see specific information pulled into the AVS if appropriate.     Maty Sevilla, APRN    06/24/21

## 2021-08-12 ENCOUNTER — OFFICE VISIT (OUTPATIENT)
Dept: FAMILY MEDICINE CLINIC | Facility: CLINIC | Age: 51
End: 2021-08-12

## 2021-08-12 VITALS
HEART RATE: 69 BPM | OXYGEN SATURATION: 99 % | TEMPERATURE: 96.9 F | DIASTOLIC BLOOD PRESSURE: 86 MMHG | HEIGHT: 60 IN | WEIGHT: 128 LBS | SYSTOLIC BLOOD PRESSURE: 124 MMHG | BODY MASS INDEX: 25.13 KG/M2

## 2021-08-12 DIAGNOSIS — H61.21 IMPACTED CERUMEN, RIGHT EAR: Primary | ICD-10-CM

## 2021-08-12 PROCEDURE — 99213 OFFICE O/P EST LOW 20 MIN: CPT | Performed by: NURSE PRACTITIONER

## 2021-08-12 RX ORDER — PSEUDOEPHEDRINE HCL 30 MG
30 TABLET ORAL EVERY 4 HOURS PRN
COMMUNITY

## 2021-08-12 NOTE — PROGRESS NOTES
Subjective   {CC  Problem List  Visit Diagnosis   Encounters  Notes  Medications  Labs  Result Review Imaging  Media :23}     Sarah D Frankel is a 51 y.o. female.     Chief Complaint   Patient presents with   • Earache     R side ear/facial/neck pain       History of Present Illness  Patient having pressure right ear and then shoots pain up right side of her head. Few times jaw was sore.   If she takes pseudoephedrine and ibuprofen it helps but wont go away.   She is very stressed due to the death of her mom and all the cleaning she has had and is very stressed.       The following portions of the patient's history were reviewed and updated as appropriate: allergies, current medications, past family history, past medical history, past social history, past surgical history and problem list.      Current Outpatient Medications:   •  ibuprofen (ADVIL,MOTRIN) 800 MG tablet, TAKE 1 TABLET BY MOUTH EVERY 8 HOURS AS NEEDED FOR MILD PAIN, Disp: 40 tablet, Rfl: 0  •  pseudoephedrine (SUDAFED) 30 MG tablet, Take 30 mg by mouth Every 4 (Four) Hours As Needed for Congestion., Disp: , Rfl:   •  citalopram (CeleXA) 10 MG tablet, Take 1 tablet by mouth Daily. (Patient taking differently: Take 10 mg by mouth Daily. PT NEVER STARTED TAKING), Disp: 30 tablet, Rfl: 0    Recent Results (from the past 4032 hour(s))   Lipid Panel    Collection Time: 06/07/21 10:43 AM    Specimen: Blood   Result Value Ref Range    Total Cholesterol 198 0 - 200 mg/dL    Triglycerides 93 0 - 150 mg/dL    HDL Cholesterol 64 (H) 40 - 60 mg/dL    LDL Cholesterol  117 (H) 0 - 100 mg/dL    VLDL Cholesterol 17 5 - 40 mg/dL    LDL/HDL Ratio 1.80    Comprehensive Metabolic Panel    Collection Time: 06/07/21 10:43 AM    Specimen: Blood   Result Value Ref Range    Glucose 62 (L) 65 - 99 mg/dL    BUN 13 6 - 20 mg/dL    Creatinine 0.61 0.57 - 1.00 mg/dL    Sodium 142 136 - 145 mmol/L    Potassium 4.6 3.5 - 5.2 mmol/L    Chloride 103 98 - 107 mmol/L    CO2 29.4  "(H) 22.0 - 29.0 mmol/L    Calcium 9.4 8.6 - 10.5 mg/dL    Total Protein 7.2 6.0 - 8.5 g/dL    Albumin 4.50 3.50 - 5.20 g/dL    ALT (SGPT) 12 1 - 33 U/L    AST (SGOT) 14 1 - 32 U/L    Alkaline Phosphatase 83 39 - 117 U/L    Total Bilirubin <0.2 0.0 - 1.2 mg/dL    eGFR Non African Amer 104 >60 mL/min/1.73    Globulin 2.7 gm/dL    A/G Ratio 1.7 g/dL    BUN/Creatinine Ratio 21.3 7.0 - 25.0    Anion Gap 9.6 5.0 - 15.0 mmol/L   Vitamin D 25 Hydroxy    Collection Time: 06/07/21 10:43 AM    Specimen: Blood   Result Value Ref Range    25 Hydroxy, Vitamin D 28.2 ng/ml   TSH    Collection Time: 06/07/21 10:43 AM    Specimen: Blood   Result Value Ref Range    TSH 1.690 0.270 - 4.200 uIU/mL   T4, free    Collection Time: 06/07/21 10:43 AM    Specimen: Blood   Result Value Ref Range    Free T4 1.05 0.93 - 1.70 ng/dL   POC Glucose    Collection Time: 06/24/21  2:23 PM    Specimen: Blood   Result Value Ref Range    Glucose 85 70 - 130 mg/dL   Hemoglobin A1c    Collection Time: 06/24/21  2:28 PM    Specimen: Blood   Result Value Ref Range    Hemoglobin A1C 5.6 3.5 - 5.6 %   Basic Metabolic Panel    Collection Time: 06/24/21  2:28 PM    Specimen: Blood   Result Value Ref Range    Glucose 76 65 - 99 mg/dL    BUN 15 6 - 20 mg/dL    Creatinine 0.71 0.57 - 1.00 mg/dL    Sodium 137 136 - 145 mmol/L    Potassium 4.8 3.5 - 5.2 mmol/L    Chloride 101 98 - 107 mmol/L    CO2 28.9 22.0 - 29.0 mmol/L    Calcium 9.6 8.6 - 10.5 mg/dL    eGFR Non African Amer 87 >60 mL/min/1.73    BUN/Creatinine Ratio 21.1 7.0 - 25.0    Anion Gap 7.1 5.0 - 15.0 mmol/L         Review of Systems    Objective     /86 (BP Location: Left arm, Patient Position: Sitting, Cuff Size: Adult)   Pulse 69   Temp 96.9 °F (36.1 °C) (Infrared)   Ht 152.4 cm (60\")   Wt 58.1 kg (128 lb)   SpO2 99%   BMI 25.00 kg/m²     Physical Exam  Vitals and nursing note reviewed.   HENT:      Head: Normocephalic.      Right Ear: There is impacted cerumen.      Nose: Nose normal.      " Mouth/Throat:      Mouth: Mucous membranes are moist.   Eyes:      Pupils: Pupils are equal, round, and reactive to light.   Cardiovascular:      Rate and Rhythm: Normal rate and regular rhythm.      Pulses: Normal pulses.      Heart sounds: Normal heart sounds.   Pulmonary:      Effort: Pulmonary effort is normal.      Breath sounds: Normal breath sounds.   Abdominal:      Palpations: Abdomen is soft.   Neurological:      General: No focal deficit present.      Mental Status: She is alert and oriented to person, place, and time.   Psychiatric:         Attention and Perception: Attention normal.         Mood and Affect: Mood and affect normal.         Speech: Speech normal.         Behavior: Behavior normal.         Thought Content: Thought content normal.         Cognition and Memory: Cognition and memory normal.         Judgment: Judgment normal.         Result Review :                Assessment/Plan    There are no diagnoses linked to this encounter.  Patient Instructions   Can use the ear wicking candle.   Avoid getting water in the ear.   Follow up with ENT      Follow Up   No follow-ups on file.    Patient was given instructions and counseling regarding her condition or for health maintenance advice. Please see specific information pulled into the AVS if appropriate.     Maty Sevilla, APRN    08/12/21

## 2024-11-22 ENCOUNTER — HOSPITAL ENCOUNTER (EMERGENCY)
Facility: HOSPITAL | Age: 54
Discharge: HOME | End: 2024-11-22
Payer: MEDICAID

## 2024-11-22 ENCOUNTER — APPOINTMENT (OUTPATIENT)
Dept: CT IMAGING | Facility: HOSPITAL | Age: 54
End: 2024-11-22
Payer: MEDICAID

## 2024-11-22 ENCOUNTER — NURSE TRIAGE (OUTPATIENT)
Dept: CALL CENTER | Facility: HOSPITAL | Age: 54
End: 2024-11-22
Payer: MEDICAID

## 2024-11-22 ENCOUNTER — APPOINTMENT (OUTPATIENT)
Dept: MRI IMAGING | Facility: HOSPITAL | Age: 54
End: 2024-11-22
Payer: MEDICAID

## 2024-11-22 VITALS
RESPIRATION RATE: 16 BRPM | DIASTOLIC BLOOD PRESSURE: 79 MMHG | TEMPERATURE: 97.9 F | HEART RATE: 73 BPM | HEIGHT: 60 IN | WEIGHT: 130.51 LBS | SYSTOLIC BLOOD PRESSURE: 129 MMHG | BODY MASS INDEX: 25.62 KG/M2 | OXYGEN SATURATION: 98 %

## 2024-11-22 DIAGNOSIS — R53.1 WEAKNESS: ICD-10-CM

## 2024-11-22 DIAGNOSIS — R20.2 FACIAL PARESTHESIA: Primary | ICD-10-CM

## 2024-11-22 LAB
ALBUMIN SERPL-MCNC: 4.7 G/DL (ref 3.5–5.2)
ALBUMIN/GLOB SERPL: 1.5 G/DL
ALP SERPL-CCNC: 113 U/L (ref 39–117)
ALT SERPL W P-5'-P-CCNC: 25 U/L (ref 1–33)
ANION GAP SERPL CALCULATED.3IONS-SCNC: 11.3 MMOL/L (ref 5–15)
AST SERPL-CCNC: 25 U/L (ref 1–32)
BASOPHILS # BLD AUTO: 0.04 10*3/MM3 (ref 0–0.2)
BASOPHILS NFR BLD AUTO: 0.6 % (ref 0–1.5)
BILIRUB SERPL-MCNC: 0.2 MG/DL (ref 0–1.2)
BILIRUB UR QL STRIP: NEGATIVE
BUN SERPL-MCNC: 12 MG/DL (ref 6–20)
BUN/CREAT SERPL: 16.4 (ref 7–25)
CALCIUM SPEC-SCNC: 9.1 MG/DL (ref 8.6–10.5)
CHLORIDE SERPL-SCNC: 103 MMOL/L (ref 98–107)
CLARITY UR: CLEAR
CO2 SERPL-SCNC: 28.7 MMOL/L (ref 22–29)
COLOR UR: YELLOW
CREAT SERPL-MCNC: 0.73 MG/DL (ref 0.57–1)
DEPRECATED RDW RBC AUTO: 41.3 FL (ref 37–54)
EGFRCR SERPLBLD CKD-EPI 2021: 99.1 ML/MIN/1.73
EOSINOPHIL # BLD AUTO: 0.17 10*3/MM3 (ref 0–0.4)
EOSINOPHIL NFR BLD AUTO: 2.7 % (ref 0.3–6.2)
ERYTHROCYTE [DISTWIDTH] IN BLOOD BY AUTOMATED COUNT: 12.1 % (ref 12.3–15.4)
GLOBULIN UR ELPH-MCNC: 3.1 GM/DL
GLUCOSE SERPL-MCNC: 75 MG/DL (ref 65–99)
GLUCOSE UR STRIP-MCNC: NEGATIVE MG/DL
HCT VFR BLD AUTO: 43.5 % (ref 34–46.6)
HGB BLD-MCNC: 14.9 G/DL (ref 12–15.9)
HGB UR QL STRIP.AUTO: NEGATIVE
HOLD SPECIMEN: NORMAL
HOLD SPECIMEN: NORMAL
IMM GRANULOCYTES # BLD AUTO: 0.01 10*3/MM3 (ref 0–0.05)
IMM GRANULOCYTES NFR BLD AUTO: 0.2 % (ref 0–0.5)
KETONES UR QL STRIP: NEGATIVE
LEUKOCYTE ESTERASE UR QL STRIP.AUTO: NEGATIVE
LYMPHOCYTES # BLD AUTO: 2.9 10*3/MM3 (ref 0.7–3.1)
LYMPHOCYTES NFR BLD AUTO: 45.5 % (ref 19.6–45.3)
MCH RBC QN AUTO: 31.8 PG (ref 26.6–33)
MCHC RBC AUTO-ENTMCNC: 34.3 G/DL (ref 31.5–35.7)
MCV RBC AUTO: 92.9 FL (ref 79–97)
MONOCYTES # BLD AUTO: 0.41 10*3/MM3 (ref 0.1–0.9)
MONOCYTES NFR BLD AUTO: 6.4 % (ref 5–12)
NEUTROPHILS NFR BLD AUTO: 2.84 10*3/MM3 (ref 1.7–7)
NEUTROPHILS NFR BLD AUTO: 44.6 % (ref 42.7–76)
NITRITE UR QL STRIP: NEGATIVE
NRBC BLD AUTO-RTO: 0 /100 WBC (ref 0–0.2)
PH UR STRIP.AUTO: 5.5 [PH] (ref 5–8)
PLATELET # BLD AUTO: 287 10*3/MM3 (ref 140–450)
PMV BLD AUTO: 9.7 FL (ref 6–12)
POTASSIUM SERPL-SCNC: 4.2 MMOL/L (ref 3.5–5.2)
PROT SERPL-MCNC: 7.8 G/DL (ref 6–8.5)
PROT UR QL STRIP: NEGATIVE
RBC # BLD AUTO: 4.68 10*6/MM3 (ref 3.77–5.28)
SODIUM SERPL-SCNC: 143 MMOL/L (ref 136–145)
SP GR UR STRIP: <=1.005 (ref 1–1.03)
UROBILINOGEN UR QL STRIP: NORMAL
WBC NRBC COR # BLD AUTO: 6.37 10*3/MM3 (ref 3.4–10.8)
WHOLE BLOOD HOLD COAG: NORMAL
WHOLE BLOOD HOLD SPECIMEN: NORMAL

## 2024-11-22 PROCEDURE — 70498 CT ANGIOGRAPHY NECK: CPT

## 2024-11-22 PROCEDURE — 80053 COMPREHEN METABOLIC PANEL: CPT

## 2024-11-22 PROCEDURE — 99285 EMERGENCY DEPT VISIT HI MDM: CPT

## 2024-11-22 PROCEDURE — 25510000001 IOPAMIDOL PER 1 ML

## 2024-11-22 PROCEDURE — 70450 CT HEAD/BRAIN W/O DYE: CPT

## 2024-11-22 PROCEDURE — 81003 URINALYSIS AUTO W/O SCOPE: CPT

## 2024-11-22 PROCEDURE — 70544 MR ANGIOGRAPHY HEAD W/O DYE: CPT

## 2024-11-22 PROCEDURE — 85025 COMPLETE CBC W/AUTO DIFF WBC: CPT

## 2024-11-22 PROCEDURE — 70496 CT ANGIOGRAPHY HEAD: CPT

## 2024-11-22 RX ORDER — SODIUM CHLORIDE 0.9 % (FLUSH) 0.9 %
10 SYRINGE (ML) INJECTION AS NEEDED
Status: DISCONTINUED | OUTPATIENT
Start: 2024-11-22 | End: 2024-11-22 | Stop reason: HOSPADM

## 2024-11-22 RX ORDER — IOPAMIDOL 755 MG/ML
100 INJECTION, SOLUTION INTRAVASCULAR
Status: COMPLETED | OUTPATIENT
Start: 2024-11-22 | End: 2024-11-22

## 2024-11-22 RX ADMIN — IOPAMIDOL 100 ML: 755 INJECTION, SOLUTION INTRAVENOUS at 11:37

## 2024-11-22 NOTE — DISCHARGE INSTRUCTIONS
Rest.  Follow-up with primary care as needed.  Patient connection has been provided to you to establish a primary care provider.  Return to the ER for any new or worsening symptoms.

## 2024-11-22 NOTE — TELEPHONE ENCOUNTER
"Reason for Disposition   [1] Numbness (i.e., loss of sensation) of the face, arm / hand, or leg / foot on one side of the body AND [2] sudden onset AND [3] present now    Additional Information   Negative: [1] SEVERE weakness (i.e., unable to walk or barely able to walk, requires support) AND [2] new-onset or worsening   Negative: [1] Weakness (i.e., paralysis, loss of muscle strength) of the face, arm / hand, or leg / foot on one side of the body AND [2] sudden onset AND [3] present now  (Exception: Bell's palsy suspected [i.e., weakness only on one side of the face, developing over hours to days, no other symptoms].)    Answer Assessment - Initial Assessment Questions  1. SYMPTOM: \"What is the main symptom you are concerned about?\" (e.g., weakness, numbness)      Numbness    2. ONSET: \"When did this start?\" (minutes, hours, days; while sleeping)      Numbess in lips, right side of face and eye drooping  3. LAST NORMAL: \"When was the last time you (the patient) were normal (no symptoms)?\"      yesterday  4. PATTERN \"Does this come and go, or has it been constant since it started?\"  \"Is it present now?\"      constant  5. CARDIAC SYMPTOMS: \"Have you had any of the following symptoms: chest pain, difficulty breathing, palpitations?\"      denies  6. NEUROLOGIC SYMPTOMS: \"Have you had any of the following symptoms: headache, dizziness, vision loss, double vision, changes in speech, unsteady on your feet?\"      denies  7. OTHER SYMPTOMS: \"Do you have any other symptoms?\"      Has had chest cold x two weeks  8. PREGNANCY: \"Is there any chance you are pregnant?\" \"When was your last menstrual period?\"      na    Protocols used: Neurologic Deficit-ADULT-  Patient reports been dealing with chest cold x two weeks, but yesterday started having numbess of lips ,tingling tongue and right side eye drooping.  Denies any other s/s discussed care advice with patient refusing to go to ER or hospital, explained it could be more " serious and needs to be evaluated, states she will go to [facility].

## 2024-11-22 NOTE — Clinical Note
[facility] EMERGENCY DEPARTMENT  [address] [place] [address]  Phone: [phone]    Sarah Frankel was seen and treated in our emergency department on 11/22/2024.  She may return to work on 11/24/2024.         Thank you for choosing [facility].    Nell Zavala APRN

## 2024-11-22 NOTE — ED PROVIDER NOTES
Subjective   History of Present Illness  Chief complaint: Facial droop Right side    Context: Patient is a 54-year-old female who presents to the ER today with complaints of right-sided facial droop and tingling that she noticed yesterday morning at 0500.  Patient denies any visual changes, headache, balance issues, chest pain, shortness of air or fever.  Patient reports she did have some dizziness that she noticed sometime yesterday.  Patient denies any history of strokes.    PCP: None    LMP: Unknown          Review of Systems   Constitutional:  Negative for fever.       Past Medical History:   Diagnosis Date    Acute sinusitis     Allergic rhinitis     Arm pain     Bunion, left     Bunion, right     Contact dermatitis     Dizziness     Foot pain, left     Foot pain, right     Gestational diabetes     Headache     Impacted cerumen, right ear     Viral upper respiratory tract infection     Vitamin D deficiency        No Known Allergies    Past Surgical History:   Procedure Laterality Date     SECTION  ,       Family History   Problem Relation Age of Onset    Heart disease Mother     Hypertension Mother     Stroke Mother     Arthritis Father     Diabetes Father        Social History     Socioeconomic History    Marital status: Single   Tobacco Use    Smoking status: Never    Smokeless tobacco: Never   Vaping Use    Vaping status: Never Used   Substance and Sexual Activity    Alcohol use: Yes     Comment: socially    Drug use: Never           Objective   Physical Exam  Constitutional:       Appearance: Normal appearance.   HENT:      Head: Normocephalic and atraumatic.      Nose: Nose normal.      Mouth/Throat:      Mouth: Mucous membranes are moist.   Eyes:      Extraocular Movements: Extraocular movements intact.      Pupils: Pupils are equal, round, and reactive to light.   Cardiovascular:      Rate and Rhythm: Normal rate and regular rhythm.      Pulses: Normal pulses.      Heart sounds: Normal  "heart sounds.   Pulmonary:      Effort: Pulmonary effort is normal.      Breath sounds: Normal breath sounds.   Abdominal:      General: Abdomen is flat.      Palpations: Abdomen is soft.      Tenderness: There is no abdominal tenderness.   Musculoskeletal:         General: Normal range of motion.      Cervical back: Normal range of motion.   Skin:     General: Skin is warm and dry.      Capillary Refill: Capillary refill takes less than 2 seconds.   Neurological:      General: No focal deficit present.      Mental Status: She is alert and oriented to person, place, and time.      Sensory: No sensory deficit.      Motor: No weakness.   Psychiatric:         Mood and Affect: Mood normal.         Behavior: Behavior normal.         Procedures           ED Course             /76   Pulse 69   Temp 97.9 °F (36.6 °C) (Oral)   Resp 16   Ht 152.4 cm (60\")   Wt 59.2 kg (130 lb 8.2 oz)   SpO2 95%   BMI 25.49 kg/m²   Labs Reviewed   CBC WITH AUTO DIFFERENTIAL - Abnormal; Notable for the following components:       Result Value    RDW 12.1 (*)     Lymphocyte % 45.5 (*)     All other components within normal limits   URINALYSIS W/ MICROSCOPIC IF INDICATED (NO CULTURE) - Normal    Narrative:     Urine microscopic not indicated.   RAINBOW DRAW    Narrative:     The following orders were created for panel order [place] Draw.  Procedure                               Abnormality         Status                     ---------                               -----------         ------                     Green Top (Gel)[[id number]]                                  Final result               Lavender Top[[id number]]                                     Final result               Gold Top - SST[[id number]]                                   Final result               Light Blue Top[[id number]]                                   Final result                 Please view results for these tests on the individual orders.   COMPREHENSIVE " METABOLIC PANEL    Narrative:     GFR Normal >60  Chronic Kidney Disease <60  Kidney Failure <15     GREEN TOP   LAVENDER TOP   GOLD TOP - SST   LIGHT BLUE TOP   CBC AND DIFFERENTIAL    Narrative:     The following orders were created for panel order CBC & Differential.  Procedure                               Abnormality         Status                     ---------                               -----------         ------                     CBC Auto Differential[[id number]]        Abnormal            Final result                 Please view results for these tests on the individual orders.     Medications   sodium chloride 0.9 % flush 10 mL (has no administration in time range)   iopamidol (ISOVUE-370) 76 % injection 100 mL (100 mL Intravenous Given 11/22/24 1137)       MRI Angiogram Head Without Contrast    Result Date: 11/22/2024  Impression: Negative MRA of the [place]. No change from the negative CTA head and neck performed 3 hours earlier today. Electronically Signed: Malgorzata Chow MD  11/22/2024 3:13 PM EST  Workstation ID: [id number]    CT Angiogram Head    Result Date: 11/22/2024  Impression: 1. No central intracranial large vessel occlusion. 2. Patent bilateral carotid arteries without stenosis. 3. Patent bilateral vertebral arteries. Electronically Signed: Richy Bragg MD  11/22/2024 12:46 PM EST  Workstation ID: [id number]    CT Angiogram Neck    Result Date: 11/22/2024  Impression: 1. No central intracranial large vessel occlusion. 2. Patent bilateral carotid arteries without stenosis. 3. Patent bilateral vertebral arteries. Electronically Signed: Richy Bragg MD  11/22/2024 12:46 PM EST  Workstation ID: [id number]    CT Head Without Contrast    Result Date: 11/22/2024  Impression: No acute intracranial abnormality. Electronically Signed: Richy Bragg MD  11/22/2024 11:46 AM EST  Workstation ID: [id number]                      NIH Stroke Scale: 1                        Medical Decision  Making  Radiology interpretation: CT head reviewed and interpreted by Alix: Negative for ICH  CTA head reviewed and interpreted by Alix:   CTA neck reviewed and interpreted by Alix:  Further interpretation by radiologist as above  Lab interpretation:  Labs all viewed by me and significant for: UA-unremarkable.  BUN 12, creatinine 0.73, white count 6.37 hemoglobin 14.9.    EKG was considered but was not emergently warranted at this time.    IV was established and labs and scans were obtained to evaluate for stroke, dissection, infection, electrolyte imbalance.  Patient is a 54-year-old female who presents today with complaint of right-sided facial tingling and droop that she noticed yesterday morning at 0500. LKW 2200 11/20.  On initial examination NIH was 1.  Patient had slight paralysis to right side of face, unilateral weakness, or decreased sensation.  CT head without, CTA head and CTA neck ordered.  CTA head, CTA neck, and CT head without all negative.  Consulted Dr. Thomson, advised to order MRI head without contrast.  Discussed MRI findings with Dr. Thomson who advised to discharge home with follow-up with primary care provider.  On reexamination patient was resting comfortably in the bed with no signs and symptoms of distress.  Discussed findings and decision to discharge home with follow-up with primary care provider.  Provided patient connection in order to establish a primary care provider.  Advised patient to return to the ER for any new or worsening symptoms.  Patient verbalized understanding of all discharge instructions.    Appropriate PPE worn during exam.  Discussed care with: Alix    i discussed findings with patient who voices understanding of discharge instructions, signs and symptoms requiring return to ED; discharged improved and in stable condition with follow up for re-evaluation.  This document is intended for medical expert use only. Reading of this document by patients and/or patient's  family without participating medical staff guidance may result in misinterpretation and unintended morbidity.  Any interpretation of such data is the responsibility of the patient and/or family member responsible for the patient in concert with their primary or specialist providers, not to be left for sources of online searches such as Simple-Fill, Hubskip or similar queries. Relying on these approaches to knowledge may result in misinterpretation, misguided goals of care and even death should patients or family members try recommendations outside of the realm of professional medical care in a supervised inpatient environment.         Problems Addressed:  Facial paresthesia: complicated acute illness or injury  Weakness: complicated acute illness or injury    Amount and/or Complexity of Data Reviewed  Labs: ordered.  Radiology: ordered.    Risk  Prescription drug management.         Final diagnoses:   Weakness   Facial paresthesia       ED Disposition  ED Disposition       ED Disposition   Discharge    Condition   Stable    Comment   --               PATIENT CONNECTION - [facility] [address]  [phone]  Call on 11/25/2024  Call on Monday to establish a primary care provider for follow up.         Medication List        Changed      citalopram 10 MG tablet  Commonly known as: CeleXA  Take 1 tablet by mouth Daily.  What changed: additional instructions                 Nell Zavala, APRN  11/22/24 0519

## 2024-11-26 ENCOUNTER — OFFICE VISIT (OUTPATIENT)
Dept: FAMILY MEDICINE CLINIC | Facility: CLINIC | Age: 54
End: 2024-11-26
Payer: MEDICAID

## 2024-11-26 VITALS
HEIGHT: 60 IN | BODY MASS INDEX: 25.13 KG/M2 | HEART RATE: 79 BPM | SYSTOLIC BLOOD PRESSURE: 124 MMHG | RESPIRATION RATE: 18 BRPM | DIASTOLIC BLOOD PRESSURE: 78 MMHG | WEIGHT: 128 LBS | OXYGEN SATURATION: 97 %

## 2024-11-26 DIAGNOSIS — G51.0 BELL'S PALSY: Primary | ICD-10-CM

## 2024-11-26 DIAGNOSIS — Z12.31 ENCOUNTER FOR SCREENING MAMMOGRAM FOR MALIGNANT NEOPLASM OF BREAST: ICD-10-CM

## 2024-11-26 PROCEDURE — 1159F MED LIST DOCD IN RCRD: CPT | Performed by: PHYSICIAN ASSISTANT

## 2024-11-26 PROCEDURE — 1125F AMNT PAIN NOTED PAIN PRSNT: CPT | Performed by: PHYSICIAN ASSISTANT

## 2024-11-26 PROCEDURE — 99203 OFFICE O/P NEW LOW 30 MIN: CPT | Performed by: PHYSICIAN ASSISTANT

## 2024-11-26 PROCEDURE — 1160F RVW MEDS BY RX/DR IN RCRD: CPT | Performed by: PHYSICIAN ASSISTANT

## 2024-11-26 RX ORDER — PREDNISONE 20 MG/1
TABLET ORAL
Qty: 21 TABLET | Refills: 0 | Status: SHIPPED | OUTPATIENT
Start: 2024-11-26

## 2024-11-26 RX ORDER — VALACYCLOVIR HYDROCHLORIDE 1 G/1
1000 TABLET, FILM COATED ORAL 3 TIMES DAILY
Qty: 21 TABLET | Refills: 0 | Status: SHIPPED | OUTPATIENT
Start: 2024-11-26

## 2024-11-26 NOTE — PROGRESS NOTES
"Chief Complaint  Chief Complaint   Patient presents with    [facility] Care    bells palsy       Subjective        Sarah D Frankel is a 54 y.o. female who presents to [facility].  History of Present Illness  Presents today for follow-up after being in the ER on 11/2022.  On 11/21, she noticed right sided facial drooping.  She proceeded to the ER in 11/22, where MRI angiogram of the head, CT angiogram of the head and neck, and CT head without contrast were all unremarkable and ruled out a stroke.  They discharged her home with follow-up with PCP.  She continues to have right sided facial drooping/paralysis, she cannot eat or drink correctly as food or drink falls out of her mouth on the right side, and is unable to close her right eye.   Her symptoms have not improved since this began.   Reports that her right eye becomes irritated and red throughout the day.  Denies decreased hearing in her right ear.     She would also like a mammogram ordered today as she has not had a mammogram.    Objective   /78 (BP Location: Right arm)   Pulse 79   Resp 18   Ht 152.4 cm (60\")   Wt 58.1 kg (128 lb)   SpO2 97%   BMI 25.00 kg/m²     Estimated body mass index is 25 kg/m² as calculated from the following:    Height as of this encounter: 152.4 cm (60\").    Weight as of this encounter: 58.1 kg (128 lb).     Physical Exam   GEN: In no acute distress, non toxic appearing  HEENT: Pupils equal and reactive to light, sclera clear.  Bell's phenomenon with attempted right eyelid closure. Bilateral EACs clear, TMs of normal healthy appearance, middle ear spaces are clear. Mucous membranes moist. Oropharynx without erythema or exudate. No cervical or submandibular lymphadenopathy.  CV: Regular rate and rhythm, no murmurs, 2+ peripheral pulses, No extremity edema.   RESP: Lungs clear to auscultation anteriorly and posteriorly in all lung fields bilaterally.  MSK: No joint erythema, deformity, or " effusion. Good ROM in upper and lower extremities.  NEURO: AAO to person, place, and time.  House-Brackmann grade IV otherwise all other cranial nerves intact grossly. Strength 5/5 in all 4 extremities. Sensation to light touch intact in all 4 extremities.   PSYCH: Affect normal, insight fair     PHQ-2 Depression Screening  Little interest or pleasure in doing things? Not at all   Feeling down, depressed, or hopeless? Not at all   PHQ-2 Total Score 0         Result Review :              Assessment and Plan     Diagnoses and all orders for this visit:    1. Bell's palsy (Primary)  -     predniSONE (DELTASONE) 20 MG tablet; Take 3 tab (60mg) PO QD for 7 days  Dispense: 21 tablet; Refill: 0  -     valACYclovir (Valtrex) 1000 MG tablet; Take 1 tablet by mouth 3 (Three) Times a Day.  Dispense: 21 tablet; Refill: 0  -     Ambulatory Referral to ENT (Otolaryngology)  Discussed her symptoms are most consistent with Bell's palsy as there is partial paralysis of 6 cranial nerve on neuro exam and Bell's phenomenon is present.  All imaging performed in the ER was within normal limits.  Discussed for her to take the steroid and Valtrex as directed.  Discussed side effect profile associated with high-dose steroid, patient expresses understanding and understands if she has significant side effects she will discontinue it.   Encouraged her to tape her eyelid when she sleeps at night and instill lubricant eyedrops throughout the day to help with eye drying and irritation.  Referral has also been made to ENT for further management of facial paralysis if it does not resolve.    2. Encounter for screening mammogram for malignant neoplasm of breast  -     Mammo Screening Digital Tomosynthesis Bilateral With CAD; Future         Follow Up     Return in about 4 weeks (around 12/24/2024) for Annual physical, Recheck.

## 2024-12-02 RX ORDER — IBUPROFEN 800 MG/1
800 TABLET, FILM COATED ORAL EVERY 8 HOURS PRN
Qty: 40 TABLET | Refills: 0 | OUTPATIENT
Start: 2024-12-02

## 2024-12-23 ENCOUNTER — OFFICE VISIT (OUTPATIENT)
Dept: FAMILY MEDICINE CLINIC | Facility: CLINIC | Age: 54
End: 2024-12-23
Payer: MEDICAID

## 2024-12-23 VITALS
DIASTOLIC BLOOD PRESSURE: 76 MMHG | OXYGEN SATURATION: 97 % | HEART RATE: 90 BPM | BODY MASS INDEX: 25.91 KG/M2 | HEIGHT: 60 IN | RESPIRATION RATE: 20 BRPM | SYSTOLIC BLOOD PRESSURE: 124 MMHG | WEIGHT: 132 LBS

## 2024-12-23 DIAGNOSIS — G44.86 CERVICOGENIC HEADACHE: ICD-10-CM

## 2024-12-23 DIAGNOSIS — Z00.00 ANNUAL PHYSICAL EXAM: Primary | ICD-10-CM

## 2024-12-23 DIAGNOSIS — G51.0 BELL'S PALSY: ICD-10-CM

## 2024-12-23 DIAGNOSIS — Z11.59 NEED FOR HEPATITIS C SCREENING TEST: ICD-10-CM

## 2024-12-23 DIAGNOSIS — Z12.11 COLON CANCER SCREENING: ICD-10-CM

## 2024-12-23 PROCEDURE — 1125F AMNT PAIN NOTED PAIN PRSNT: CPT | Performed by: PHYSICIAN ASSISTANT

## 2024-12-23 PROCEDURE — 99396 PREV VISIT EST AGE 40-64: CPT | Performed by: PHYSICIAN ASSISTANT

## 2024-12-23 PROCEDURE — 1159F MED LIST DOCD IN RCRD: CPT | Performed by: PHYSICIAN ASSISTANT

## 2024-12-23 PROCEDURE — 1160F RVW MEDS BY RX/DR IN RCRD: CPT | Performed by: PHYSICIAN ASSISTANT

## 2024-12-23 RX ORDER — IBUPROFEN 800 MG/1
800 TABLET, FILM COATED ORAL EVERY 8 HOURS PRN
Qty: 21 TABLET | Refills: 0 | Status: SHIPPED | OUTPATIENT
Start: 2024-12-23

## 2024-12-23 NOTE — PROGRESS NOTES
"Chief complaint: Patient presents today for a physical          Patient is a 54 y.o. female who presents for her yearly physical exam.     HPI   Recently had bell's palsy, did well on the steroid.  Never received the antiviral due to insurance not covering it.  States she kept her right eye taped shut at night and used lubricating eye drops.   Believes she has full return of function of the right side of her face.   Denies waking up with her eye feeling dry or irritated.    She would like ibuprofen 800mg refilled, she takes this twice a week for headaches, pain due to bunions.   States her previous PCP would send this in for her.   States she has headaches that caused right-sided neck pain and right-sided neck tension.    - Exercise: intermittent   - ETOH:none  - Smoking: none, used to smoke socially and rarely   - Diet: does well with eating fruits and vegetables, some processed foods, cooks at home, drinks mostly water, milk, and tea. Rarely soft drinks.    -Sleep: no concerns  - Contraception: none, history of tubal ligation   - Menopause: about March 2024   - Depression: no concerns    - Substance Use: none     -No family history of colon, breast, uterine, or ovarian cancer.                         /76 (BP Location: Right arm, Patient Position: Sitting)   Pulse 90   Resp 20   Ht 152.4 cm (60\")   Wt 59.9 kg (132 lb)   SpO2 97%   BMI 25.78 kg/m²       GEN: In no acute distress, non toxic appearing  HEENT: Bilateral EACs clear, TMs of normal healthy appearance, middle ear spaces are clear. Mucous membranes moist. Oropharynx without erythema or exudate. No cervical or submandibular lymphadenopathy.  CV: Regular rate and rhythm, no murmurs, 2+ peripheral pulses, No extremity edema.   RESP: Lungs clear to auscultation anteriorly and posteriorly in all lung fields bilaterally.  MSK: exhibits bilateral paracervical muscle and trapezius muscle tightness and tenderness. No joint erythema, deformity, or " effusion. Good ROM in upper and lower extremities.  NEURO: AAO to person, place, and time.  House-Brackmann grade 1 otherwise all cranial nerves intact grossly.    PSYCH: Affect normal, insight fair             Assessment & Plan  Annual physical exam    Orders:    Hemoglobin A1c; Future    Lipid Panel; Future    TSH Rfx On Abnormal To Free T4; Future    Urinalysis With Microscopic - Urine, Clean Catch; Future    Need for hepatitis C screening test    Orders:    Hepatitis C Antibody; Future    Colon cancer screening    Orders:    Cologuard - Stool, Per Rectum; Future    Cervicogenic headache  Discussed her headaches are most likely due to paracervical muscle tightness as her headaches coincide with neck pain.  Encouraged her to apply heat, massage, and gently stretch her neck.  Discussed that we will send in ibuprofen 800 mg, however due to adverse side effects and potential kidney complications, I would not recommend taking this long-term, she understands and will take NSAIDs sparingly.  Discussed the option of physical therapy which she will think on.  Orders:    ibuprofen (ADVIL,MOTRIN) 800 MG tablet; Take 1 tablet by mouth Every 8 (Eight) Hours As Needed for Mild Pain.    Bell's palsy  Right-sided facial function has returned.  As she able to fully close her right eye, discussed she does not need to follow-up with ENT for further evaluation which she agrees with.            HM:  Colorectal Screening:  would prefer to do cologuard, ordered today  Pap: Last pap in about 2009, recommended to see GYN   Mammogram: ordered 11/2024, scheduled in 1/2025  Bone Density/DEXA: Start if postmenopausal w/ Rfs, otherwise > 65  Hep C: One time screening unless high risk     Screening Labs & Tests:   A1C, urinalysis, lipid panel, TFT (CBC and CMP not ordered today as she had these performed in November)  DEXA (65+ or postmenopausal with risk factors): N/A  HEP C: Ordered today     Immunization/Vaccinations  Influenza: Recommended  annual influenza vaccine  Tetanus/Pertussis: Recommended   Pneumovax: Not needed at this time  Shingles: Recommended  COVID: Recommended     Lifestyle Counseling:  Lifestyle Modifications: Continue good lifestyle choices/modifications, Encouraged diet primarily of whole foods, decrease processed / packaged foods.  Reduce exposure to stress if possible.  Goal 150 minutes of moderate intensity exercise per week.  Decrease screen time.    Recommended annual dental/vision examination.    Follow up: Return in about 6 months (around 6/23/2025) for Recheck.  Plan of care discussed with pt. They verbalized understanding and agreement.

## 2025-01-07 DIAGNOSIS — R19.5 POSITIVE COLORECTAL CANCER SCREENING USING COLOGUARD TEST: Primary | ICD-10-CM

## 2025-01-14 DIAGNOSIS — Z12.31 ENCOUNTER FOR SCREENING MAMMOGRAM FOR MALIGNANT NEOPLASM OF BREAST: ICD-10-CM

## 2025-02-04 ENCOUNTER — LAB (OUTPATIENT)
Dept: FAMILY MEDICINE CLINIC | Facility: CLINIC | Age: 55
End: 2025-02-04
Payer: MEDICAID

## 2025-02-04 DIAGNOSIS — Z11.59 NEED FOR HEPATITIS C SCREENING TEST: ICD-10-CM

## 2025-02-04 DIAGNOSIS — Z00.00 ANNUAL PHYSICAL EXAM: ICD-10-CM

## 2025-02-04 LAB
CHOLEST SERPL-MCNC: 211 MG/DL (ref 0–200)
HBA1C MFR BLD: 6.3 % (ref 4.8–5.6)
HCV AB SER QL: NORMAL
HDLC SERPL-MCNC: 59 MG/DL (ref 40–60)
LDLC SERPL CALC-MCNC: 129 MG/DL (ref 0–100)
LDLC/HDLC SERPL: 2.13 {RATIO}
TRIGL SERPL-MCNC: 132 MG/DL (ref 0–150)
TSH SERPL DL<=0.05 MIU/L-ACNC: 0.99 UIU/ML (ref 0.27–4.2)
VLDLC SERPL-MCNC: 23 MG/DL (ref 5–40)

## 2025-02-04 PROCEDURE — 80061 LIPID PANEL: CPT | Performed by: PHYSICIAN ASSISTANT

## 2025-02-04 PROCEDURE — 86803 HEPATITIS C AB TEST: CPT | Performed by: PHYSICIAN ASSISTANT

## 2025-02-04 PROCEDURE — 83036 HEMOGLOBIN GLYCOSYLATED A1C: CPT | Performed by: PHYSICIAN ASSISTANT

## 2025-02-04 PROCEDURE — 36415 COLL VENOUS BLD VENIPUNCTURE: CPT

## 2025-02-04 PROCEDURE — 84443 ASSAY THYROID STIM HORMONE: CPT | Performed by: PHYSICIAN ASSISTANT

## 2025-04-11 ENCOUNTER — OFFICE VISIT (OUTPATIENT)
Dept: FAMILY MEDICINE CLINIC | Facility: CLINIC | Age: 55
End: 2025-04-11
Payer: MEDICAID

## 2025-04-11 VITALS
WEIGHT: 133 LBS | OXYGEN SATURATION: 98 % | DIASTOLIC BLOOD PRESSURE: 80 MMHG | BODY MASS INDEX: 26.11 KG/M2 | HEART RATE: 74 BPM | SYSTOLIC BLOOD PRESSURE: 124 MMHG | HEIGHT: 60 IN | RESPIRATION RATE: 20 BRPM

## 2025-04-11 DIAGNOSIS — R31.29 OTHER MICROSCOPIC HEMATURIA: ICD-10-CM

## 2025-04-11 DIAGNOSIS — M54.41 ACUTE RIGHT-SIDED LOW BACK PAIN WITH RIGHT-SIDED SCIATICA: Primary | ICD-10-CM

## 2025-04-11 LAB
BILIRUB BLD-MCNC: NEGATIVE MG/DL
CLARITY, POC: CLEAR
COLOR UR: YELLOW
EXPIRATION DATE: ABNORMAL
GLUCOSE UR STRIP-MCNC: NEGATIVE MG/DL
KETONES UR QL: NEGATIVE
LEUKOCYTE EST, POC: NEGATIVE
Lab: ABNORMAL
NITRITE UR-MCNC: NEGATIVE MG/ML
PH UR: 6.5 [PH] (ref 5–8)
PROT UR STRIP-MCNC: NEGATIVE MG/DL
RBC # UR STRIP: ABNORMAL /UL
SP GR UR: 1 (ref 1–1.03)
UROBILINOGEN UR QL: NORMAL

## 2025-04-11 RX ORDER — METHYLPREDNISOLONE 4 MG/1
TABLET ORAL
Qty: 21 TABLET | Refills: 0 | Status: SHIPPED | OUTPATIENT
Start: 2025-04-11

## 2025-04-11 NOTE — PROGRESS NOTES
"Chief Complaint  Chief Complaint   Patient presents with    Back Pain     2x3 weeks       Subjective        Sarah D Frankel is a 55 y.o. female who presents to [facility].  History of Present Illness  Presents today for concerns of low back pain for the last 2 to 3 weeks.  Mostly located at right lower back but sometimes has left sided low back pain.   Has lower back aching and soreness and mild lower abdominal cramping.   Pain radiates into her right leg, sometimes all the way into her right lower leg.   When she is wiping after urinating and notices very mild amount of blood on underwear.  Having mucosal vaginal discharge that does not have an odor.   Has not had a period since 6/2024.   Does not believe blood is related to urinating but is related to vaginal bleeding.  Going up and down stairs sometimes aggravates low back pain.   Applying heat improves right sided low back pain.  Has had lower back pain with sciatica in the past, attended PT with improvement.   Denies lift anything heavy or recent injury, dysuria, urinary frequency, urinary urgency, leg numbness/tingling/weakness, saddle anesthesia, or urinary/bowel incontinence.  All pap smears have been normal but has not had a pap smear in about 16 years.  Has not scheduled appointment with OBGYN yet.  Denies family history of uterine or ovarian cancer.   Sexually active occasionally with one partner.     Objective   /80 (BP Location: Left arm)   Pulse 74   Resp 20   Ht 152.4 cm (60\")   Wt 60.3 kg (133 lb)   SpO2 98%   BMI 25.97 kg/m²     Estimated body mass index is 25.97 kg/m² as calculated from the following:    Height as of this encounter: 152.4 cm (60\").    Weight as of this encounter: 60.3 kg (133 lb).     Physical Exam   GEN: In no acute distress, non toxic appearing  CV: Regular rate and rhythm, no murmurs, 2+ peripheral pulses, No extremity edema.   RESP: Lungs clear to auscultation anteriorly and " posteriorly in all lung fields bilaterally.  MSK: Right low back is TTP.  Negative straight leg raise test bilaterally.  No CVA tenderness.  No joint erythema, deformity, or effusion. Good ROM in upper and lower extremities.  NEURO: AAO to person, place, and time. CN 2-12 intact grossly. Strength 5/5 in BLE. Sensation to light touch intact in BLE.   PSYCH: Affect normal, insight fair      Urine dip shows small amount of blood, otherwise unremarkable.    Result Review :              Assessment and Plan     Assessment & Plan  Acute right-sided low back pain with right-sided sciatica  Discussed I believe much of her low back pain that radiates occasionally into the posterior aspect of her right leg is musculoskeletal in nature.  Encouraged her to apply heat, massage, stretch, apply lidocaine patch as needed, take Tylenol, and take steroid pack as directed.  As she had improvement with physical therapy in the past, referral to physical therapy has been made.  Orders:    Ambulatory Referral to Physical Therapy for Evaluation & Treatment    POCT urinalysis dipstick, automated    methylPREDNISolone (MEDROL) 4 MG dose pack; Take as directed on package instructions.    Other microscopic hematuria  Urine dip shows small amount of blood, patient denies seeing blood on toilet paper with wiping and denies blood from vaginal area while providing urine sample.  Urine microscopy has been sent for further evaluation.  Suspicion is low for nephrolithiasis as she does not exhibit CVA tenderness, denies nausea, vomiting, or intractable pain.  I would also like her to repeat a urinalysis with culture if needed in about 2 weeks to determine if she has hematuria.  As she has noted blood with wiping and believes it to be vaginal in nature, strongly encouraged her to make an appointment with her OB/GYN for further evaluation as she has not had a Pap smear in about 16 years, she agrees to do so.  Orders:    Urinalysis With Culture If  Indicated -; Future    Urinalysis With Microscopic - Urine, Clean Catch; Future    Discussed if she begins to have nausea, vomiting, flank pain, gross blood in urine, or other concerning symptoms to proceed to the ER.  She is in agreement with this plan and will call with any issues or concerns meantime.       Follow Up     Return if symptoms worsen or fail to improve.

## 2025-04-14 ENCOUNTER — LAB (OUTPATIENT)
Dept: FAMILY MEDICINE CLINIC | Facility: CLINIC | Age: 55
End: 2025-04-14
Payer: MEDICAID

## 2025-04-14 DIAGNOSIS — R31.29 OTHER MICROSCOPIC HEMATURIA: ICD-10-CM

## 2025-04-14 LAB
BACTERIA UR QL AUTO: NORMAL /HPF
BILIRUB UR QL STRIP: NEGATIVE
CLARITY UR: CLEAR
COLOR UR: YELLOW
GLUCOSE UR STRIP-MCNC: NEGATIVE MG/DL
HGB UR QL STRIP.AUTO: ABNORMAL
HOLD SPECIMEN: NORMAL
HYALINE CASTS UR QL AUTO: NORMAL /LPF
KETONES UR QL STRIP: NEGATIVE
LEUKOCYTE ESTERASE UR QL STRIP.AUTO: NEGATIVE
NITRITE UR QL STRIP: NEGATIVE
PH UR STRIP.AUTO: 6.5 [PH] (ref 5–8)
PROT UR QL STRIP: NEGATIVE
RBC # UR STRIP: NORMAL /HPF
REF LAB TEST METHOD: NORMAL
SP GR UR STRIP: 1.01 (ref 1–1.03)
SQUAMOUS #/AREA URNS HPF: NORMAL /HPF
UROBILINOGEN UR QL STRIP: ABNORMAL
WBC # UR STRIP: NORMAL /HPF

## 2025-04-14 PROCEDURE — 81001 URINALYSIS AUTO W/SCOPE: CPT | Performed by: PHYSICIAN ASSISTANT
